# Patient Record
Sex: FEMALE | Race: WHITE | NOT HISPANIC OR LATINO | Employment: FULL TIME | URBAN - METROPOLITAN AREA
[De-identification: names, ages, dates, MRNs, and addresses within clinical notes are randomized per-mention and may not be internally consistent; named-entity substitution may affect disease eponyms.]

---

## 2017-06-07 ENCOUNTER — HISTORICAL (OUTPATIENT)
Dept: LAB | Facility: HOSPITAL | Age: 47
End: 2017-06-07

## 2017-06-07 LAB
APPEARANCE, UA: CLEAR
BACTERIA SPEC CULT: ABNORMAL /HPF
BILIRUB UR QL STRIP: NEGATIVE
COLOR UR: ABNORMAL
GLUCOSE (UA): NEGATIVE
HGB UR QL STRIP: ABNORMAL
KETONES UR QL STRIP: NEGATIVE
LEUKOCYTE ESTERASE UR QL STRIP: ABNORMAL
NITRITE UR QL STRIP: NEGATIVE
PH UR STRIP: 6 [PH]
PROT UR QL STRIP: 100 MG/DL
RBC #/AREA URNS HPF: ABNORMAL /HPF
SP GR UR STRIP: 1.02
SQUAMOUS EPITHELIAL, UA: ABNORMAL /LPF
UROBILINOGEN UR STRIP-ACNC: 0.2 EU/DL
WBC #/AREA URNS HPF: ABNORMAL /HPF

## 2017-06-09 LAB — FINAL CULTURE: NO GROWTH

## 2017-06-16 ENCOUNTER — HISTORICAL (OUTPATIENT)
Dept: ADMINISTRATIVE | Facility: HOSPITAL | Age: 47
End: 2017-06-16

## 2017-07-14 ENCOUNTER — HISTORICAL (OUTPATIENT)
Dept: LAB | Facility: HOSPITAL | Age: 47
End: 2017-07-14

## 2017-07-14 LAB
APPEARANCE, UA: CLEAR
BACTERIA SPEC CULT: ABNORMAL /HPF
BILIRUB UR QL STRIP: NEGATIVE
COLOR UR: YELLOW
GLUCOSE (UA): NEGATIVE
HGB UR QL STRIP: ABNORMAL
KETONES UR QL STRIP: NEGATIVE
LEUKOCYTE ESTERASE UR QL STRIP: ABNORMAL
NITRITE UR QL STRIP: NEGATIVE
PH UR STRIP: 7 [PH]
PROT UR QL STRIP: ABNORMAL
RBC #/AREA URNS HPF: ABNORMAL /HPF
SP GR UR STRIP: 1.01
SQUAMOUS EPITHELIAL, UA: ABNORMAL /LPF
UROBILINOGEN UR STRIP-ACNC: 0.2 EU/DL
WBC #/AREA URNS HPF: ABNORMAL /HPF

## 2017-07-17 LAB — FINAL CULTURE: NO GROWTH

## 2018-03-14 ENCOUNTER — HISTORICAL (OUTPATIENT)
Dept: RADIOLOGY | Facility: HOSPITAL | Age: 48
End: 2018-03-14

## 2018-05-24 ENCOUNTER — HISTORICAL (OUTPATIENT)
Dept: RADIOLOGY | Facility: HOSPITAL | Age: 48
End: 2018-05-24

## 2018-06-01 ENCOUNTER — HISTORICAL (OUTPATIENT)
Dept: RADIOLOGY | Facility: HOSPITAL | Age: 48
End: 2018-06-01

## 2018-06-29 ENCOUNTER — HISTORICAL (OUTPATIENT)
Dept: RADIOLOGY | Facility: HOSPITAL | Age: 48
End: 2018-06-29

## 2019-11-20 ENCOUNTER — HISTORICAL (OUTPATIENT)
Dept: LAB | Facility: HOSPITAL | Age: 49
End: 2019-11-20

## 2019-11-20 LAB
ABS NEUT (OLG): 2.5 X10(3)/MCL (ref 1.5–6.9)
ALBUMIN SERPL-MCNC: 3.7 GM/DL (ref 3.4–5)
ALBUMIN/GLOB SERPL: 1.1 RATIO
ALP SERPL-CCNC: 107 UNIT/L (ref 30–113)
ALT SERPL-CCNC: 109 UNIT/L (ref 10–45)
AST SERPL-CCNC: 53 UNIT/L (ref 15–37)
BASOPHILS # BLD AUTO: 0.1 X10(3)/MCL (ref 0–0.1)
BASOPHILS NFR BLD AUTO: 1 % (ref 0–1)
BILIRUB SERPL-MCNC: 0.4 MG/DL (ref 0.1–0.9)
BILIRUBIN DIRECT+TOT PNL SERPL-MCNC: 0.1 MG/DL (ref 0–0.3)
BILIRUBIN DIRECT+TOT PNL SERPL-MCNC: 0.3 MG/DL
BUN SERPL-MCNC: 18 MG/DL (ref 10–20)
CALCIUM SERPL-MCNC: 8.8 MG/DL (ref 8–10.5)
CHLORIDE SERPL-SCNC: 108 MMOL/L (ref 100–108)
CO2 SERPL-SCNC: 29 MMOL/L (ref 21–35)
CREAT SERPL-MCNC: 0.97 MG/DL (ref 0.7–1.3)
CRP SERPL-MCNC: <0.2 MG/DL (ref 0–0.9)
DEPRECATED CALCIDIOL+CALCIFEROL SERPL-MC: 22.71 NG/ML (ref 30–80)
EOSINOPHIL # BLD AUTO: 0.2 X10(3)/MCL (ref 0–0.6)
EOSINOPHIL NFR BLD AUTO: 4 % (ref 0–5)
ERYTHROCYTE [DISTWIDTH] IN BLOOD BY AUTOMATED COUNT: 12.9 % (ref 11.5–17)
ERYTHROCYTE [SEDIMENTATION RATE] IN BLOOD: 15 MM/HR (ref 0–20)
FERRITIN SERPL-MCNC: 42 NG/ML (ref 3–252)
FOLATE SERPL-MCNC: 4 NG/ML (ref 8.6–58.9)
GLOBULIN SER-MCNC: 3.4 GM/DL
GLUCOSE SERPL-MCNC: 99 MG/DL (ref 75–116)
HCT VFR BLD AUTO: 41.9 % (ref 36–48)
HGB BLD-MCNC: 13.5 GM/DL (ref 12–16)
IMM GRANULOCYTES # BLD AUTO: 0.03 10*3/UL (ref 0–0.02)
IMM GRANULOCYTES NFR BLD AUTO: 0.5 % (ref 0–0.43)
IRON SATN MFR SERPL: 27 % (ref 15–55)
IRON SERPL-MCNC: 74 MCG/DL (ref 50–170)
LYMPHOCYTES # BLD AUTO: 2.4 X10(3)/MCL (ref 0.5–4.1)
LYMPHOCYTES NFR BLD AUTO: 43 % (ref 15–40)
MCH RBC QN AUTO: 33 PG (ref 27–34)
MCHC RBC AUTO-ENTMCNC: 32 GM/DL (ref 31–36)
MCV RBC AUTO: 103 FL (ref 80–99)
MONOCYTES # BLD AUTO: 0.4 X10(3)/MCL (ref 0–1.1)
MONOCYTES NFR BLD AUTO: 8 % (ref 4–12)
NEUTROPHILS # BLD AUTO: 2.5 X10(3)/MCL (ref 1.5–6.9)
NEUTROPHILS NFR BLD AUTO: 44 % (ref 43–75)
PLATELET # BLD AUTO: 229 X10(3)/MCL (ref 140–400)
PMV BLD AUTO: 9.6 FL (ref 6.8–10)
POTASSIUM SERPL-SCNC: 4.3 MMOL/L (ref 3.6–5.2)
PROT SERPL-MCNC: 7.1 GM/DL (ref 6.4–8.2)
RBC # BLD AUTO: 4.06 X10(6)/MCL (ref 4.2–5.4)
SODIUM SERPL-SCNC: 142 MMOL/L (ref 135–145)
TIBC SERPL-MCNC: 199 MCG/DL (ref 150–375)
TIBC SERPL-MCNC: 273 MCG/DL (ref 250–450)
TSH SERPL-ACNC: 2.85 MIU/ML (ref 0.36–3.74)
VIT B12 SERPL-MCNC: 318 PG/ML (ref 193–986)
WBC # SPEC AUTO: 5.6 X10(3)/MCL (ref 4.5–11.5)

## 2019-11-29 ENCOUNTER — HISTORICAL (OUTPATIENT)
Dept: RADIOLOGY | Facility: HOSPITAL | Age: 49
End: 2019-11-29

## 2019-12-06 ENCOUNTER — HISTORICAL (OUTPATIENT)
Dept: RADIOLOGY | Facility: HOSPITAL | Age: 49
End: 2019-12-06

## 2019-12-10 ENCOUNTER — HISTORICAL (OUTPATIENT)
Dept: LAB | Facility: HOSPITAL | Age: 49
End: 2019-12-10

## 2019-12-10 LAB
FERRITIN SERPL-MCNC: 47.7 NG/ML (ref 8–388)
HAV IGM SERPL QL IA: NEGATIVE
HBV SURFACE AG SERPL QL IA: NEGATIVE
HCV AB SERPL QL IA: NEGATIVE
IRON SATN MFR SERPL: 25.4 % (ref 20–50)
IRON SERPL-MCNC: 77 MCG/DL (ref 50–175)
TIBC SERPL-MCNC: 303 MCG/DL (ref 250–450)
TRANSFERRIN SERPL-MCNC: 243 MG/DL (ref 200–360)

## 2019-12-13 LAB — CRC RECOMMENDATION EXT: NORMAL

## 2020-01-14 ENCOUNTER — HISTORICAL (OUTPATIENT)
Dept: RADIOLOGY | Facility: HOSPITAL | Age: 50
End: 2020-01-14

## 2020-01-20 ENCOUNTER — HISTORICAL (OUTPATIENT)
Dept: RADIOLOGY | Facility: HOSPITAL | Age: 50
End: 2020-01-20

## 2020-10-02 ENCOUNTER — HISTORICAL (OUTPATIENT)
Dept: LAB | Facility: HOSPITAL | Age: 50
End: 2020-10-02

## 2021-01-20 ENCOUNTER — HISTORICAL (OUTPATIENT)
Dept: RADIOLOGY | Facility: HOSPITAL | Age: 51
End: 2021-01-20

## 2021-09-01 ENCOUNTER — HISTORICAL (OUTPATIENT)
Dept: LAB | Facility: HOSPITAL | Age: 51
End: 2021-09-01

## 2021-09-01 LAB
ABS NEUT (OLG): 3.1 X10(3)/MCL (ref 1.5–6.9)
ALBUMIN SERPL-MCNC: 2.8 GM/DL (ref 3.5–5)
ALBUMIN/GLOB SERPL: 0.8 RATIO (ref 1.1–2)
ALP SERPL-CCNC: 68 UNIT/L (ref 40–150)
ALT SERPL-CCNC: 14 UNIT/L (ref 0–55)
AST SERPL-CCNC: 18 UNIT/L (ref 5–34)
BASOPHILS # BLD AUTO: 0 X10(3)/MCL (ref 0–0.1)
BASOPHILS NFR BLD AUTO: 1 % (ref 0–1)
BILIRUB SERPL-MCNC: 0.3 MG/DL
BILIRUBIN DIRECT+TOT PNL SERPL-MCNC: 0.1 MG/DL (ref 0–0.8)
BILIRUBIN DIRECT+TOT PNL SERPL-MCNC: 0.2 MG/DL (ref 0–0.5)
BUN SERPL-MCNC: 10 MG/DL (ref 9.8–20.1)
CALCIUM SERPL-MCNC: 8.8 MG/DL (ref 8.4–10.2)
CHLORIDE SERPL-SCNC: 109 MMOL/L (ref 98–107)
CHOLEST SERPL-MCNC: 178 MG/DL
CHOLEST/HDLC SERPL: 5 {RATIO} (ref 0–5)
CO2 SERPL-SCNC: 25 MMOL/L (ref 22–29)
CREAT SERPL-MCNC: 0.91 MG/DL (ref 0.55–1.02)
EOSINOPHIL # BLD AUTO: 0.1 X10(3)/MCL (ref 0–0.6)
EOSINOPHIL NFR BLD AUTO: 2 % (ref 0–5)
ERYTHROCYTE [DISTWIDTH] IN BLOOD BY AUTOMATED COUNT: 13.9 % (ref 11.5–17)
GLOBULIN SER-MCNC: 3.7 GM/DL (ref 2.4–3.5)
GLUCOSE SERPL-MCNC: 128 MG/DL (ref 74–100)
HCT VFR BLD AUTO: 33.9 % (ref 36–48)
HDLC SERPL-MCNC: 34 MG/DL (ref 35–60)
HGB BLD-MCNC: 11.2 GM/DL (ref 12–16)
IMM GRANULOCYTES # BLD AUTO: 0.06 10*3/UL (ref 0–0.02)
IMM GRANULOCYTES NFR BLD AUTO: 1 % (ref 0–0.43)
LDLC SERPL CALC-MCNC: 105 MG/DL (ref 50–140)
LYMPHOCYTES # BLD AUTO: 2 X10(3)/MCL (ref 0.5–4.1)
LYMPHOCYTES NFR BLD AUTO: 33 % (ref 15–40)
MCH RBC QN AUTO: 32 PG (ref 27–34)
MCHC RBC AUTO-ENTMCNC: 33 GM/DL (ref 31–36)
MCV RBC AUTO: 96 FL (ref 80–99)
MONOCYTES # BLD AUTO: 0.6 X10(3)/MCL (ref 0–1.1)
MONOCYTES NFR BLD AUTO: 10 % (ref 4–12)
NEUTROPHILS # BLD AUTO: 3.1 X10(3)/MCL (ref 1.5–6.9)
NEUTROPHILS NFR BLD AUTO: 53 % (ref 43–75)
PLATELET # BLD AUTO: 466 X10(3)/MCL (ref 140–400)
PMV BLD AUTO: 9.1 FL (ref 6.8–10)
POTASSIUM SERPL-SCNC: 2.4 MMOL/L (ref 3.5–5.1)
PROT SERPL-MCNC: 6.5 GM/DL (ref 6.4–8.3)
RBC # BLD AUTO: 3.52 X10(6)/MCL (ref 4.2–5.4)
SODIUM SERPL-SCNC: 140 MMOL/L (ref 136–145)
TRIGL SERPL-MCNC: 193 MG/DL (ref 37–140)
TSH SERPL-ACNC: 0.85 UIU/ML (ref 0.35–4.94)
VLDLC SERPL CALC-MCNC: 39 MG/DL
WBC # SPEC AUTO: 5.9 X10(3)/MCL (ref 4.5–11.5)

## 2021-09-02 ENCOUNTER — HISTORICAL (OUTPATIENT)
Dept: LAB | Facility: HOSPITAL | Age: 51
End: 2021-09-02

## 2021-09-02 LAB
BUN SERPL-MCNC: 8 MG/DL (ref 9.8–20.1)
CALCIUM SERPL-MCNC: 8.6 MG/DL (ref 8.4–10.2)
CHLORIDE SERPL-SCNC: 110 MMOL/L (ref 98–107)
CO2 SERPL-SCNC: 27 MMOL/L (ref 22–29)
CREAT SERPL-MCNC: 0.83 MG/DL (ref 0.55–1.02)
CREAT/UREA NIT SERPL: 10
GLUCOSE SERPL-MCNC: 97 MG/DL (ref 74–100)
POTASSIUM SERPL-SCNC: 3.7 MMOL/L (ref 3.5–5.1)
SODIUM SERPL-SCNC: 141 MMOL/L (ref 136–145)

## 2021-09-07 ENCOUNTER — HISTORICAL (OUTPATIENT)
Dept: RADIOLOGY | Facility: HOSPITAL | Age: 51
End: 2021-09-07

## 2021-11-02 ENCOUNTER — HISTORICAL (OUTPATIENT)
Dept: RADIOLOGY | Facility: HOSPITAL | Age: 51
End: 2021-11-02

## 2021-12-06 ENCOUNTER — HISTORICAL (OUTPATIENT)
Dept: RADIOLOGY | Facility: HOSPITAL | Age: 51
End: 2021-12-06

## 2022-01-18 ENCOUNTER — HISTORICAL (OUTPATIENT)
Dept: LAB | Facility: HOSPITAL | Age: 52
End: 2022-01-18

## 2022-01-25 ENCOUNTER — HISTORICAL (OUTPATIENT)
Dept: RADIOLOGY | Facility: HOSPITAL | Age: 52
End: 2022-01-25

## 2022-03-02 ENCOUNTER — HISTORICAL (OUTPATIENT)
Dept: ADMINISTRATIVE | Facility: HOSPITAL | Age: 52
End: 2022-03-02

## 2022-03-14 ENCOUNTER — HISTORICAL (OUTPATIENT)
Dept: ADMINISTRATIVE | Facility: HOSPITAL | Age: 52
End: 2022-03-14

## 2022-03-17 ENCOUNTER — PATIENT OUTREACH (OUTPATIENT)
Dept: EMERGENCY MEDICINE | Facility: HOSPITAL | Age: 52
End: 2022-03-17
Payer: MEDICAID

## 2022-03-29 ENCOUNTER — HISTORICAL (OUTPATIENT)
Dept: ADMINISTRATIVE | Facility: HOSPITAL | Age: 52
End: 2022-03-29

## 2022-03-29 ENCOUNTER — HISTORICAL (OUTPATIENT)
Dept: RADIOLOGY | Facility: HOSPITAL | Age: 52
End: 2022-03-29

## 2022-04-09 ENCOUNTER — HISTORICAL (OUTPATIENT)
Dept: ADMINISTRATIVE | Facility: HOSPITAL | Age: 52
End: 2022-04-09
Payer: MEDICAID

## 2022-04-26 VITALS
HEIGHT: 65 IN | SYSTOLIC BLOOD PRESSURE: 121 MMHG | BODY MASS INDEX: 23.88 KG/M2 | OXYGEN SATURATION: 98 % | DIASTOLIC BLOOD PRESSURE: 80 MMHG | WEIGHT: 143.31 LBS

## 2022-04-30 NOTE — OP NOTE
DATE OF SURGERY:    06/16/2017    SURGEON:  Arpit Duarte MD    PREOPERATIVE DIAGNOSES:  Right ureteropelvic junction obstruction, hydronephrosis.    POSTOPERATIVE DIAGNOSES:  Right ureteropelvic junction obstruction, hydronephrosis.    PROCEDURES:  Cystoscopy, right retrograde right diagnostic ureteroscopy.    ANESTHESIA:  General.    COMPLICATIONS:  None.    IV FLUIDS:  1,000 cc crystalloid.    ESTIMATED BLOOD LOSS:  0 cc.    FINDINGS:  Removal of right indwelling ureteral stent, right retrograde pyelogram showing obstructed UPJ as previous, diagnostic ureteroscopy with no signs of tumor, inability of bypass obstruction with scope.    DISPOSITION:  Taken to PACU in stable condition.    CONDITION:  Without problems.    BRIEF CLINICAL HISTORY:  This patient is a 46-year-old  female who has had recurrent pyelonephritis and flank pain with multiple admissions.  She underwent workup by me on previous admission, showing suspected right UPJ obstruction.  Stent was placed.  She was kept on treatment for a UTI and followed up.  Plans today were for diagnostic ureteroscopy to ensure no malignancy as cause of obstruction.  All risks, benefits and treatment alternatives were discussed.  Well informed consent was obtained.    PROCEDURE IN DETAIL:  After informed consent, she was taken to the operating room and placed in supine position.  General endotracheal anesthesia was induced.  She was repositioned in dorsal lithotomy.  She was prepped and draped in usual sterile fashion.  She received appropriate preoperative antibiotics.  Timeout was performed.  We entered the bladder with 21-Korean rigid cystoscope, visualized right indwelling ureteral stent.  Glidewire was placed alongside the stent under fluoro guidance.  Stent was then removed.  Under direct vision, a 2nd wire had been cannulated through the stent.  We back bled the stent.  5-Korean was placed over the wire.  Gentle retrograde pyelogram  showing a tight area near UPJ with minimal contrast passing.  We then used the indwelling wire and advanced the scope over the wire to the level of the UPJ.  Removed the wire.  We performed diagnostic ureteroscopy.  We were unable to bypass the obstruction.  There were no signs of ureteral malignancy in this area.  At this point, we left her without a stent and emptied the bladder, terminated the procedure.    PLAN:  She will be scheduled for robotic right pyeloplasty.  We will keep her on prophylactic antibiotics and pain control for the time being.  She is to call clinic or return to the ER with any fevers greater than 101.4, intractable pain, intractable nausea, intractable vomiting.        ______________________________  MD OCTAVIA Mcgee/OLIVER  DD:  06/16/2017  Time:  01:25PM  DT:  06/17/2017  Time:  12:46AM  Job #:  [id number]

## 2022-05-16 DIAGNOSIS — M54.16 RADICULOPATHY, LUMBAR REGION: ICD-10-CM

## 2022-05-16 DIAGNOSIS — S32.018A OTHER FRACTURE OF FIRST LUMBAR VERTEBRA, INITIAL ENCOUNTER FOR CLOSED FRACTURE: Primary | ICD-10-CM

## 2022-05-16 DIAGNOSIS — M62.830 MUSCLE SPASM OF BACK: ICD-10-CM

## 2022-05-25 ENCOUNTER — PATIENT OUTREACH (OUTPATIENT)
Dept: EMERGENCY MEDICINE | Facility: HOSPITAL | Age: 52
End: 2022-05-25
Payer: MEDICAID

## 2022-06-02 ENCOUNTER — HOSPITAL ENCOUNTER (OUTPATIENT)
Dept: RADIOLOGY | Facility: HOSPITAL | Age: 52
Discharge: HOME | End: 2022-06-02
Attending: NURSE PRACTITIONER
Payer: MEDICAID

## 2022-06-02 DIAGNOSIS — M54.16 RADICULOPATHY, LUMBAR REGION: ICD-10-CM

## 2022-06-02 DIAGNOSIS — S32.018A OTHER FRACTURE OF FIRST LUMBAR VERTEBRA, INITIAL ENCOUNTER FOR CLOSED FRACTURE: ICD-10-CM

## 2022-06-02 DIAGNOSIS — M62.830 MUSCLE SPASM OF BACK: ICD-10-CM

## 2022-06-02 PROCEDURE — 72148 MRI LUMBAR SPINE W/O DYE: CPT | Mod: TC
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
## 2022-07-27 ENCOUNTER — PATIENT OUTREACH (OUTPATIENT)
Dept: EMERGENCY MEDICINE | Facility: HOSPITAL | Age: 52
End: 2022-07-27
Payer: MEDICAID

## 2022-08-08 DIAGNOSIS — M54.9 BACK PAIN, UNSPECIFIED BACK LOCATION, UNSPECIFIED BACK PAIN LATERALITY, UNSPECIFIED CHRONICITY: Primary | ICD-10-CM

## 2022-09-26 ENCOUNTER — HOSPITAL ENCOUNTER (EMERGENCY)
Facility: HOSPITAL | Age: 52
Discharge: HOME | End: 2022-09-26
Attending: INTERNAL MEDICINE
Payer: MEDICAID

## 2022-09-26 VITALS
SYSTOLIC BLOOD PRESSURE: 119 MMHG | WEIGHT: 134.19 LBS | RESPIRATION RATE: 16 BRPM | HEIGHT: 65 IN | TEMPERATURE: 99 F | HEART RATE: 84 BPM | DIASTOLIC BLOOD PRESSURE: 73 MMHG | OXYGEN SATURATION: 98 % | BODY MASS INDEX: 22.36 KG/M2

## 2022-09-26 DIAGNOSIS — T78.40XA ALLERGIC REACTION, INITIAL ENCOUNTER: ICD-10-CM

## 2022-09-26 DIAGNOSIS — L29.9 PRURITUS: ICD-10-CM

## 2022-09-26 DIAGNOSIS — L50.9 URTICARIA: Primary | ICD-10-CM

## 2022-09-26 PROCEDURE — 25000003 PHARM REV CODE 250: Performed by: INTERNAL MEDICINE

## 2022-09-26 PROCEDURE — 63600175 PHARM REV CODE 636 W HCPCS: Performed by: INTERNAL MEDICINE

## 2022-09-26 PROCEDURE — 96372 THER/PROPH/DIAG INJ SC/IM: CPT | Performed by: INTERNAL MEDICINE

## 2022-09-26 PROCEDURE — 99284 EMERGENCY DEPT VISIT MOD MDM: CPT | Mod: 25

## 2022-09-26 RX ORDER — DIPHENHYDRAMINE HCL 25 MG
25 CAPSULE ORAL
Status: COMPLETED | OUTPATIENT
Start: 2022-09-26 | End: 2022-09-26

## 2022-09-26 RX ORDER — HYDROXYZINE 50 MG/ML
25 INJECTION, SOLUTION INTRAMUSCULAR ONCE
Status: COMPLETED | OUTPATIENT
Start: 2022-09-26 | End: 2022-09-26

## 2022-09-26 RX ORDER — HYDROXYZINE HYDROCHLORIDE 25 MG/1
25 TABLET, FILM COATED ORAL 3 TIMES DAILY PRN
Qty: 30 TABLET | Refills: 0 | Status: SHIPPED | OUTPATIENT
Start: 2022-09-26 | End: 2022-10-06

## 2022-09-26 RX ADMIN — DIPHENHYDRAMINE HYDROCHLORIDE 25 MG: 25 CAPSULE ORAL at 05:09

## 2022-09-26 RX ADMIN — HYDROXYZINE HYDROCHLORIDE 25 MG: 50 INJECTION, SOLUTION INTRAMUSCULAR at 05:09

## 2022-09-26 NOTE — ED PROVIDER NOTES
Encounter Date: 2022       History     Chief Complaint   Patient presents with    Rash     Pt to ED c/o itchy rash on bilateral arms, trunk, and neck     52-year-old white female presents to the emergency department with urticaria on arms neck and scalp.  She states that Friday she began having some itchy spots on her arms, she admits that she was doing some yd work and does.  It was something that she got her hands into causing a local reaction and through the weekend it became of extremely pruritic but it was still limited to arms.  Over the night last night it beginning on upper arms and spread to her neck and chest and head so she came to the ER this morning to get evaluated.    Review of patient's allergies indicates:  No Known Allergies  Past Medical History:   Diagnosis Date    Anxiety disorder, unspecified     Depression      Past Surgical History:   Procedure Laterality Date    APPENDECTOMY      BACK SURGERY      x2     SECTION, CLASSIC      x4    CHOLECYSTECTOMY      COSMETIC SURGERY      reconstruction    TONSILLECTOMY       History reviewed. No pertinent family history.  Social History     Tobacco Use    Smoking status: Some Days     Types: Cigarettes    Smokeless tobacco: Never   Substance Use Topics    Alcohol use: Yes     Comment: occassional     Review of Systems   Constitutional: Negative.  Negative for activity change, appetite change, chills, diaphoresis, fatigue, fever and unexpected weight change.   HENT: Negative.  Negative for congestion, dental problem, drooling, ear discharge, ear pain, facial swelling, hearing loss, mouth sores, nosebleeds, postnasal drip, rhinorrhea, sinus pressure, sinus pain, sneezing, sore throat, tinnitus, trouble swallowing and voice change.    Eyes: Negative.  Negative for photophobia, pain, discharge, redness, itching and visual disturbance.   Respiratory: Negative.  Negative for apnea, cough, choking, chest tightness, shortness of breath, wheezing and  stridor.    Cardiovascular: Negative.  Negative for chest pain, palpitations and leg swelling.   Gastrointestinal: Negative.  Negative for abdominal distention, abdominal pain, anal bleeding, blood in stool, constipation, diarrhea, nausea, rectal pain and vomiting.   Endocrine: Negative.  Negative for cold intolerance, heat intolerance, polydipsia, polyphagia and polyuria.   Genitourinary: Negative.  Negative for decreased urine volume, difficulty urinating, dyspareunia, dysuria, enuresis, flank pain, frequency, genital sores, hematuria, menstrual problem, pelvic pain, urgency, vaginal bleeding, vaginal discharge and vaginal pain.   Musculoskeletal: Negative.  Negative for arthralgias, back pain, gait problem, joint swelling, myalgias, neck pain and neck stiffness.   Skin:  Positive for rash. Negative for color change, pallor and wound.   Allergic/Immunologic: Negative.  Negative for environmental allergies, food allergies and immunocompromised state.   Neurological: Negative.  Negative for dizziness, tremors, seizures, syncope, facial asymmetry, speech difficulty, weakness, light-headedness, numbness and headaches.   Hematological: Negative.  Negative for adenopathy. Does not bruise/bleed easily.   Psychiatric/Behavioral: Negative.  Negative for agitation, behavioral problems, confusion, decreased concentration, dysphoric mood, hallucinations, self-injury, sleep disturbance and suicidal ideas. The patient is not nervous/anxious and is not hyperactive.    All other systems reviewed and are negative.    Physical Exam     Initial Vitals [09/26/22 0452]   BP Pulse Resp Temp SpO2   119/73 84 16 98.6 °F (37 °C) 98 %      MAP       --         Physical Exam    Nursing note and vitals reviewed.  Constitutional: She appears well-developed and well-nourished. She is not diaphoretic. No distress.   HENT:   Head: Normocephalic and atraumatic.   Mouth/Throat: Oropharynx is clear and moist. No oropharyngeal exudate.   Eyes:  Conjunctivae and EOM are normal. Pupils are equal, round, and reactive to light. No scleral icterus.   Neck: Neck supple. No JVD present.   Normal range of motion.  Cardiovascular:  Normal rate, regular rhythm, normal heart sounds and intact distal pulses.     Exam reveals no gallop and no friction rub.       No murmur heard.  Pulmonary/Chest: Breath sounds normal. No respiratory distress. She has no wheezes. She exhibits no tenderness.   Abdominal: Abdomen is soft. Bowel sounds are normal. She exhibits no distension. There is no abdominal tenderness. There is no rebound.   Musculoskeletal:         General: Normal range of motion.      Cervical back: Normal range of motion and neck supple.     Neurological: She is alert and oriented to person, place, and time. She has normal strength and normal reflexes.   Skin: Skin is warm and dry. Capillary refill takes less than 2 seconds. Rash noted. Rash is urticarial.   Urticaria noted bilateral arms with excoriations and minimal erythema, more areas of urticaria on the neck bilaterally with some erythema in on the anterior chest wall   Psychiatric: She has a normal mood and affect. Her behavior is normal. Judgment and thought content normal.       ED Course   Procedures  Labs Reviewed - No data to display       Imaging Results    None          Medications   hydrOXYzine injection 25 mg (25 mg Intramuscular Given 9/26/22 0509)   diphenhydrAMINE capsule 25 mg (25 mg Oral Given 9/26/22 0509)                 ED Course as of 09/26/22 0549   Mon Sep 26, 2022   0547 She feels much better after the Vistaril injection and oral Benadryl.  She is no longer scratching.  The hives or beginning to reduce in the erythema is completely gone currently she states she is ready to go home and I will write her a script for hydroxyzine to use as needed for itching [PL]      ED Course User Index  [PL] Matthew Nolasco MD                 Clinical Impression:   Final diagnoses:  [L50.9] Urticaria  (Primary)  [T78.40XA] Allergic reaction, initial encounter  [L29.9] Pruritus      ED Disposition Condition    Discharge Stable          ED Prescriptions       Medication Sig Dispense Start Date End Date Auth. Provider    hydrOXYzine HCL (ATARAX) 25 MG tablet Take 1 tablet (25 mg total) by mouth 3 (three) times daily as needed for Itching. 30 tablet 9/26/2022 10/6/2022 Matthew Nolasco MD          Follow-up Information       Follow up With Specialties Details Why Contact Info    Primary care physician  In 3 days            Gina Fang is a certified MA and was present during the entire interaction with this patient      Matthew Nolasco MD  09/26/22 0549

## 2023-04-19 ENCOUNTER — HOSPITAL ENCOUNTER (EMERGENCY)
Facility: HOSPITAL | Age: 53
Discharge: HOME | End: 2023-04-19
Attending: EMERGENCY MEDICINE
Payer: MEDICAID

## 2023-04-19 VITALS
DIASTOLIC BLOOD PRESSURE: 84 MMHG | BODY MASS INDEX: 23.16 KG/M2 | OXYGEN SATURATION: 99 % | RESPIRATION RATE: 18 BRPM | TEMPERATURE: 99 F | HEART RATE: 78 BPM | WEIGHT: 139.19 LBS | SYSTOLIC BLOOD PRESSURE: 138 MMHG

## 2023-04-19 DIAGNOSIS — S09.90XA CLOSED HEAD INJURY, INITIAL ENCOUNTER: ICD-10-CM

## 2023-04-19 DIAGNOSIS — T14.8XXA ABRASION: ICD-10-CM

## 2023-04-19 DIAGNOSIS — Z12.31 ENCOUNTER FOR SCREENING MAMMOGRAM FOR MALIGNANT NEOPLASM OF BREAST: Primary | ICD-10-CM

## 2023-04-19 DIAGNOSIS — S20.212A CONTUSION OF LEFT FRONT WALL OF THORAX, INITIAL ENCOUNTER: Primary | ICD-10-CM

## 2023-04-19 PROCEDURE — 25000003 PHARM REV CODE 250: Performed by: EMERGENCY MEDICINE

## 2023-04-19 PROCEDURE — 96372 THER/PROPH/DIAG INJ SC/IM: CPT | Performed by: EMERGENCY MEDICINE

## 2023-04-19 PROCEDURE — 63600175 PHARM REV CODE 636 W HCPCS: Performed by: EMERGENCY MEDICINE

## 2023-04-19 PROCEDURE — 99285 EMERGENCY DEPT VISIT HI MDM: CPT | Mod: 25

## 2023-04-19 RX ORDER — CYCLOBENZAPRINE HCL 10 MG
10 TABLET ORAL 3 TIMES DAILY PRN
Qty: 15 TABLET | Refills: 0 | Status: SHIPPED | OUTPATIENT
Start: 2023-04-19 | End: 2023-04-24

## 2023-04-19 RX ORDER — KETOROLAC TROMETHAMINE 30 MG/ML
30 INJECTION, SOLUTION INTRAMUSCULAR; INTRAVENOUS
Status: COMPLETED | OUTPATIENT
Start: 2023-04-19 | End: 2023-04-19

## 2023-04-19 RX ORDER — HYDROCODONE BITARTRATE AND ACETAMINOPHEN 5; 325 MG/1; MG/1
1 TABLET ORAL
Status: COMPLETED | OUTPATIENT
Start: 2023-04-19 | End: 2023-04-19

## 2023-04-19 RX ORDER — IBUPROFEN 800 MG/1
800 TABLET ORAL EVERY 8 HOURS PRN
Qty: 20 TABLET | Refills: 0 | Status: SHIPPED | OUTPATIENT
Start: 2023-04-19

## 2023-04-19 RX ADMIN — KETOROLAC TROMETHAMINE 30 MG: 30 INJECTION, SOLUTION INTRAMUSCULAR; INTRAVENOUS at 06:04

## 2023-04-19 RX ADMIN — HYDROCODONE BITARTRATE AND ACETAMINOPHEN 1 TABLET: 5; 325 TABLET ORAL at 05:04
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
## 2023-04-26 ENCOUNTER — HOSPITAL ENCOUNTER (OUTPATIENT)
Dept: RADIOLOGY | Facility: HOSPITAL | Age: 53
Discharge: HOME | End: 2023-04-26
Attending: NURSE PRACTITIONER
Payer: MEDICAID

## 2023-04-26 DIAGNOSIS — Z12.31 ENCOUNTER FOR SCREENING MAMMOGRAM FOR MALIGNANT NEOPLASM OF BREAST: ICD-10-CM

## 2023-04-26 PROCEDURE — 77067 MAMMO DIGITAL SCREENING BILAT WITH TOMO: ICD-10-PCS | Mod: 26,,, | Performed by: STUDENT IN AN ORGANIZED HEALTH CARE EDUCATION/TRAINING PROGRAM

## 2023-04-26 PROCEDURE — 77067 SCR MAMMO BI INCL CAD: CPT | Mod: 26,,, | Performed by: STUDENT IN AN ORGANIZED HEALTH CARE EDUCATION/TRAINING PROGRAM

## 2023-04-26 PROCEDURE — 77063 MAMMO DIGITAL SCREENING BILAT WITH TOMO: ICD-10-PCS | Mod: 26,,, | Performed by: STUDENT IN AN ORGANIZED HEALTH CARE EDUCATION/TRAINING PROGRAM

## 2023-04-26 PROCEDURE — 77067 SCR MAMMO BI INCL CAD: CPT | Mod: TC

## 2023-04-26 PROCEDURE — 77063 BREAST TOMOSYNTHESIS BI: CPT | Mod: 26,,, | Performed by: STUDENT IN AN ORGANIZED HEALTH CARE EDUCATION/TRAINING PROGRAM

## 2023-05-22 LAB
CHOLEST SERPL-MSCNC: 225 MG/DL (ref 0–200)
CO2 SERPL-SCNC: 19 MMOL/L (ref 20–29)
CREAT SERPL-MCNC: 1.1 MG/DL (ref 0.6–1)
HBA1C MFR BLD: 5.1 % (ref 4–6)
HDLC SERPL-MCNC: 62 MG/DL (ref 35–70)
LDLC SERPL CALC-MCNC: 135 MG/DL (ref 0–160)
TRIGL SERPL-MCNC: 159 MG/DL (ref 40–160)
TSH: 1.88

## 2023-05-23 DIAGNOSIS — K58.0 IRRITABLE BOWEL SYNDROME WITH DIARRHEA: Primary | ICD-10-CM

## 2023-07-12 ENCOUNTER — HOSPITAL ENCOUNTER (EMERGENCY)
Facility: HOSPITAL | Age: 53
Discharge: HOME | End: 2023-07-12
Attending: EMERGENCY MEDICINE
Payer: MEDICAID

## 2023-07-12 VITALS
HEART RATE: 82 BPM | DIASTOLIC BLOOD PRESSURE: 72 MMHG | BODY MASS INDEX: 23.16 KG/M2 | OXYGEN SATURATION: 98 % | WEIGHT: 139 LBS | SYSTOLIC BLOOD PRESSURE: 110 MMHG | RESPIRATION RATE: 18 BRPM | HEIGHT: 65 IN | TEMPERATURE: 99 F

## 2023-07-12 DIAGNOSIS — N39.0 URINARY TRACT INFECTION WITHOUT HEMATURIA, SITE UNSPECIFIED: ICD-10-CM

## 2023-07-12 DIAGNOSIS — G43.809 OTHER MIGRAINE WITHOUT STATUS MIGRAINOSUS, NOT INTRACTABLE: Primary | ICD-10-CM

## 2023-07-12 LAB
ALBUMIN SERPL-MCNC: 4 G/DL (ref 3.5–5)
ALBUMIN/GLOB SERPL: 1.3 RATIO (ref 1.1–2)
ALP SERPL-CCNC: 74 UNIT/L (ref 40–150)
ALT SERPL-CCNC: 27 UNIT/L (ref 0–55)
APPEARANCE UR: CLEAR
AST SERPL-CCNC: 23 UNIT/L (ref 5–34)
BACTERIA #/AREA URNS AUTO: ABNORMAL /HPF
BASOPHILS # BLD AUTO: 0.09 X10(3)/MCL
BASOPHILS NFR BLD AUTO: 1.4 %
BILIRUB UR QL STRIP.AUTO: NEGATIVE
BILIRUBIN DIRECT+TOT PNL SERPL-MCNC: 0.3 MG/DL
BUN SERPL-MCNC: 11 MG/DL (ref 9.8–20.1)
CALCIUM SERPL-MCNC: 9.2 MG/DL (ref 8.4–10.2)
CHLORIDE SERPL-SCNC: 112 MMOL/L (ref 98–107)
CO2 SERPL-SCNC: 21 MMOL/L (ref 22–29)
COLOR UR: YELLOW
CREAT SERPL-MCNC: 1.23 MG/DL (ref 0.55–1.02)
EOSINOPHIL # BLD AUTO: 0.14 X10(3)/MCL (ref 0–0.9)
EOSINOPHIL NFR BLD AUTO: 2.2 %
ERYTHROCYTE [DISTWIDTH] IN BLOOD BY AUTOMATED COUNT: 12 % (ref 11.5–17)
FLUAV AG UPPER RESP QL IA.RAPID: NOT DETECTED
FLUBV AG UPPER RESP QL IA.RAPID: NOT DETECTED
GFR SERPLBLD CREATININE-BSD FMLA CKD-EPI: 52 MLS/MIN/1.73/M2
GLOBULIN SER-MCNC: 3.2 GM/DL (ref 2.4–3.5)
GLUCOSE SERPL-MCNC: 112 MG/DL (ref 74–100)
GLUCOSE UR QL STRIP.AUTO: NEGATIVE
HCT VFR BLD AUTO: 37.6 % (ref 37–47)
HGB BLD-MCNC: 12.3 G/DL (ref 12–16)
IMM GRANULOCYTES # BLD AUTO: 0.02 X10(3)/MCL (ref 0–0.04)
IMM GRANULOCYTES NFR BLD AUTO: 0.3 %
KETONES UR QL STRIP.AUTO: NEGATIVE
LACTATE SERPL-SCNC: 0.7 MMOL/L (ref 0.5–2.2)
LEUKOCYTE ESTERASE UR QL STRIP.AUTO: ABNORMAL
LYMPHOCYTES # BLD AUTO: 2.46 X10(3)/MCL (ref 0.6–4.6)
LYMPHOCYTES NFR BLD AUTO: 38.1 %
MCH RBC QN AUTO: 31.8 PG (ref 27–31)
MCHC RBC AUTO-ENTMCNC: 32.7 G/DL (ref 33–36)
MCV RBC AUTO: 97.2 FL (ref 80–94)
MONOCYTES # BLD AUTO: 0.4 X10(3)/MCL (ref 0.1–1.3)
MONOCYTES NFR BLD AUTO: 6.2 %
NEUTROPHILS # BLD AUTO: 3.34 X10(3)/MCL (ref 2.1–9.2)
NEUTROPHILS NFR BLD AUTO: 51.8 %
NITRITE UR QL STRIP.AUTO: NEGATIVE
PH UR STRIP.AUTO: 6.5 [PH]
PLATELET # BLD AUTO: 275 X10(3)/MCL (ref 130–400)
PMV BLD AUTO: 9.2 FL (ref 7.4–10.4)
POTASSIUM SERPL-SCNC: 3.6 MMOL/L (ref 3.5–5.1)
PROT SERPL-MCNC: 7.2 GM/DL (ref 6.4–8.3)
PROT UR QL STRIP.AUTO: NEGATIVE
RBC # BLD AUTO: 3.87 X10(6)/MCL (ref 4.2–5.4)
RBC #/AREA URNS AUTO: ABNORMAL /HPF
RBC UR QL AUTO: ABNORMAL
SARS-COV-2 RNA RESP QL NAA+PROBE: NOT DETECTED
SODIUM SERPL-SCNC: 141 MMOL/L (ref 136–145)
SP GR UR STRIP.AUTO: 1.01
SQUAMOUS #/AREA URNS AUTO: ABNORMAL /HPF
STREP A PCR (OHS): NOT DETECTED
UROBILINOGEN UR STRIP-ACNC: 0.2
WBC # SPEC AUTO: 6.45 X10(3)/MCL (ref 4.5–11.5)
WBC #/AREA URNS AUTO: >=100 /HPF

## 2023-07-12 PROCEDURE — 63600175 PHARM REV CODE 636 W HCPCS: Performed by: NURSE PRACTITIONER

## 2023-07-12 PROCEDURE — 0240U COVID/FLU A&B PCR: CPT | Performed by: NURSE PRACTITIONER

## 2023-07-12 PROCEDURE — 80053 COMPREHEN METABOLIC PANEL: CPT | Performed by: NURSE PRACTITIONER

## 2023-07-12 PROCEDURE — 87651 STREP A DNA AMP PROBE: CPT | Performed by: NURSE PRACTITIONER

## 2023-07-12 PROCEDURE — 81001 URINALYSIS AUTO W/SCOPE: CPT | Performed by: NURSE PRACTITIONER

## 2023-07-12 PROCEDURE — 85025 COMPLETE CBC W/AUTO DIFF WBC: CPT | Performed by: NURSE PRACTITIONER

## 2023-07-12 PROCEDURE — 87088 URINE BACTERIA CULTURE: CPT | Performed by: NURSE PRACTITIONER

## 2023-07-12 PROCEDURE — 83605 ASSAY OF LACTIC ACID: CPT | Performed by: NURSE PRACTITIONER

## 2023-07-12 PROCEDURE — 25000003 PHARM REV CODE 250: Performed by: NURSE PRACTITIONER

## 2023-07-12 PROCEDURE — 96372 THER/PROPH/DIAG INJ SC/IM: CPT | Performed by: NURSE PRACTITIONER

## 2023-07-12 PROCEDURE — 99284 EMERGENCY DEPT VISIT MOD MDM: CPT

## 2023-07-12 RX ORDER — DEXAMETHASONE SODIUM PHOSPHATE 4 MG/ML
4 INJECTION, SOLUTION INTRA-ARTICULAR; INTRALESIONAL; INTRAMUSCULAR; INTRAVENOUS; SOFT TISSUE
Status: DISCONTINUED | OUTPATIENT
Start: 2023-07-12 | End: 2023-07-12

## 2023-07-12 RX ORDER — CEFDINIR 300 MG/1
300 CAPSULE ORAL 2 TIMES DAILY
Qty: 20 CAPSULE | Refills: 0 | Status: SHIPPED | OUTPATIENT
Start: 2023-07-12 | End: 2023-07-22

## 2023-07-12 RX ORDER — LIDOCAINE HYDROCHLORIDE 10 MG/ML
2 INJECTION, SOLUTION EPIDURAL; INFILTRATION; INTRACAUDAL; PERINEURAL
Status: COMPLETED | OUTPATIENT
Start: 2023-07-12 | End: 2023-07-12

## 2023-07-12 RX ORDER — KETOROLAC TROMETHAMINE 30 MG/ML
30 INJECTION, SOLUTION INTRAMUSCULAR; INTRAVENOUS
Status: COMPLETED | OUTPATIENT
Start: 2023-07-12 | End: 2023-07-12

## 2023-07-12 RX ORDER — CEFTRIAXONE 1 G/1
1 INJECTION, POWDER, FOR SOLUTION INTRAMUSCULAR; INTRAVENOUS
Status: COMPLETED | OUTPATIENT
Start: 2023-07-12 | End: 2023-07-12

## 2023-07-12 RX ADMIN — KETOROLAC TROMETHAMINE 30 MG: 30 INJECTION, SOLUTION INTRAMUSCULAR; INTRAVENOUS at 04:07

## 2023-07-12 RX ADMIN — LIDOCAINE HYDROCHLORIDE 20 MG: 10 INJECTION, SOLUTION EPIDURAL; INFILTRATION; INTRACAUDAL; PERINEURAL at 05:07

## 2023-07-12 RX ADMIN — CEFTRIAXONE SODIUM 1 G: 1 INJECTION, POWDER, FOR SOLUTION INTRAMUSCULAR; INTRAVENOUS at 05:07

## 2023-07-12 NOTE — ED PROVIDER NOTES
"Encounter Date: 2023       History     Chief Complaint   Patient presents with    Headache     Generalized bodyaches, low grade fever, tired started 3 days ago.  Pt was seen at urgent care today, negative for covid and strep, pt was given a steroid inj. Today.      Patient is a 53-year-old female who presents emerged department complaints of generalized body aches low-grade temperature and feeling tired for 3 days.  She also has a headache and neck pain.  She states she had negative COVID and strep was given steroid injection Andres has not helped.  She denies any other complaints or associated symptoms.    Review of patient's allergies indicates:   Allergen Reactions    Latex Other (See Comments)     Other reaction(s): Blisters/Breaks down skin  "It eats through my skin"       Past Medical History:   Diagnosis Date    Anxiety disorder, unspecified     Depression      Past Surgical History:   Procedure Laterality Date    APPENDECTOMY      BACK SURGERY      x2     SECTION, CLASSIC      x4    CHOLECYSTECTOMY      COSMETIC SURGERY      reconstruction    TONSILLECTOMY       History reviewed. No pertinent family history.  Social History     Tobacco Use    Smoking status: Some Days     Types: Cigarettes    Smokeless tobacco: Never   Substance Use Topics    Alcohol use: Yes     Comment: occassional     Review of Systems   Constitutional:  Negative for activity change, appetite change and fever.   HENT:  Negative for congestion, dental problem and sore throat.    Eyes:  Negative for discharge and itching.   Respiratory:  Negative for apnea, chest tightness and shortness of breath.    Cardiovascular:  Negative for chest pain.   Gastrointestinal:  Negative for abdominal distention, abdominal pain and nausea.   Endocrine: Negative for cold intolerance and heat intolerance.   Genitourinary:  Positive for difficulty urinating. Negative for dysuria.   Musculoskeletal:  Negative for back pain.   Skin:  Negative for " rash.   Neurological:  Negative for dizziness, facial asymmetry and weakness.   Hematological:  Does not bruise/bleed easily.   Psychiatric/Behavioral:  Negative for agitation and behavioral problems.      Physical Exam     Initial Vitals [07/12/23 1446]   BP Pulse Resp Temp SpO2   96/62 80 18 99.2 °F (37.3 °C) 97 %      MAP       --         Physical Exam    Nursing note and vitals reviewed.  Constitutional: Vital signs are normal. She appears well-developed and well-nourished.  Non-toxic appearance. She does not have a sickly appearance.   HENT:   Head: Normocephalic and atraumatic.   Right Ear: External ear normal.   Left Ear: External ear normal.   Eyes: Conjunctivae, EOM and lids are normal. Pupils are equal, round, and reactive to light. Lids are everted and swept, no foreign bodies found.   Neck: Trachea normal and phonation normal. Neck supple. No thyroid mass and no thyromegaly present.   Normal range of motion.   Full passive range of motion without pain.     Cardiovascular:  Normal rate, regular rhythm, S1 normal, S2 normal, normal heart sounds, intact distal pulses and normal pulses.           Pulmonary/Chest: Breath sounds normal. No respiratory distress.   Musculoskeletal:         General: Normal range of motion.      Cervical back: Full passive range of motion without pain, normal range of motion and neck supple.      Comments: Pain make mainly localized to the paraspinal muscles in the neck.  No midline tenderness to palpation.  Negative axial load tenderness.     Lymphadenopathy:     She has no cervical adenopathy.   Neurological: She is alert and oriented to person, place, and time. She has normal strength. GCS score is 15. GCS eye subscore is 4. GCS verbal subscore is 5. GCS motor subscore is 6.   Skin: Skin is warm, dry and intact. Capillary refill takes less than 2 seconds.   Psychiatric: She has a normal mood and affect. Her speech is normal and behavior is normal. Judgment normal. Cognition and  memory are normal.       ED Course   Procedures  Labs Reviewed   COMPREHENSIVE METABOLIC PANEL - Abnormal; Notable for the following components:       Result Value    Chloride 112 (*)     Carbon Dioxide 21 (*)     Glucose Level 112 (*)     Creatinine 1.23 (*)     All other components within normal limits   URINALYSIS, REFLEX TO URINE CULTURE - Abnormal; Notable for the following components:    Blood, UA 2+ (*)     Leukocyte Esterase, UA 3+ (*)     All other components within normal limits   CBC WITH DIFFERENTIAL - Abnormal; Notable for the following components:    RBC 3.87 (*)     MCV 97.2 (*)     MCH 31.8 (*)     MCHC 32.7 (*)     All other components within normal limits   URINALYSIS, MICROSCOPIC - Abnormal; Notable for the following components:    Bacteria, UA Few (*)     WBC, UA >=100 (*)     Squamous Epithelial Cells, UA Few (*)     All other components within normal limits   LACTIC ACID, PLASMA - Normal   COVID/FLU A&B PCR - Normal    Narrative:     The Xpert Xpress SARS-CoV-2/FLU/RSV plus is a rapid, multiplexed real-time PCR test intended for the simultaneous qualitative detection and differentiation of SARS-CoV-2, Influenza A, Influenza B, and respiratory syncytial virus (RSV) viral RNA in either nasopharyngeal swab or nasal swab specimens.         STREP GROUP A BY PCR - Normal    Narrative:     The Xpert Xpress Strep A test is a rapid, qualitative in vitro diagnostic test for the detection of Streptococcus pyogenes (Group A ß-hemolytic Streptococcus, Strep A) in throat swab specimens from patients with signs and symptoms of pharyngitis.     CULTURE, URINE   CBC W/ AUTO DIFFERENTIAL    Narrative:     The following orders were created for panel order CBC auto differential.  Procedure                               Abnormality         Status                     ---------                               -----------         ------                     CBC with Differential[[id number]]        Abnormal             Final result                 Please view results for these tests on the individual orders.          Imaging Results    None          Medications   cefTRIAXone injection 1 g (has no administration in time range)   LIDOcaine (PF) 10 mg/ml (1%) injection 20 mg (has no administration in time range)   ketorolac injection 30 mg (30 mg Intramuscular Given 7/12/23 1604)                       Medical Decision Making  Patient is a 53-year-old female presents emerged department complaints of headache neck pain generalized body aches and low-grade temperature for the last few days.  After my 1st time assessing her she did not have any urinary complaints however after reviewing her urine I did speak to the patient again and she does report difficulty starting urination.    Problems Addressed:  Urinary tract infection without hematuria, site unspecified: acute illness or injury     Details: IM and p.o. medications sent to patient's pharmacy to continue outpatient basis.  Patient has a follow-up with the PCP on Friday which I discussed getting a urine sample to have test of clear for antibiotic therapy.  Patient was aware of plan of care including return emerged department should she have any changing worsening symptoms.    Amount and/or Complexity of Data Reviewed  External Data Reviewed: labs, radiology and notes.  Labs: ordered. Decision-making details documented in ED Course.            Clinical Impression:   Final diagnoses:  [G43.809] Other migraine without status migrainosus, not intractable (Primary)  [N39.0] Urinary tract infection without hematuria, site unspecified        ED Disposition Condition    Discharge Stable          ED Prescriptions       Medication Sig Dispense Start Date End Date Auth. Provider    cefdinir (OMNICEF) 300 MG capsule Take 1 capsule (300 mg total) by mouth 2 (two) times daily. for 10 days 20 capsule 7/12/2023 7/22/2023 ELIGIO Monroy          Follow-up Information       Follow up With  Specialties Details Why Contact Info    ELIGIO Harris  Schedule an appointment as soon as possible for a visit  As needed, For ER Follow Up. [address] [place] [address]  [phone]               ELIGIO Monroy  07/12/23 6887

## 2023-07-15 LAB — BACTERIA UR CULT: NO GROWTH

## 2023-08-05 ENCOUNTER — HOSPITAL ENCOUNTER (EMERGENCY)
Facility: HOSPITAL | Age: 53
Discharge: HOME | End: 2023-08-05
Attending: GENERAL ACUTE CARE HOSPITAL
Payer: MEDICAID

## 2023-08-05 VITALS
TEMPERATURE: 98 F | HEIGHT: 65 IN | SYSTOLIC BLOOD PRESSURE: 120 MMHG | WEIGHT: 139 LBS | RESPIRATION RATE: 18 BRPM | OXYGEN SATURATION: 98 % | HEART RATE: 86 BPM | DIASTOLIC BLOOD PRESSURE: 81 MMHG | BODY MASS INDEX: 23.16 KG/M2

## 2023-08-05 DIAGNOSIS — R11.0 NAUSEA: ICD-10-CM

## 2023-08-05 DIAGNOSIS — N30.01 ACUTE CYSTITIS WITH HEMATURIA: Primary | ICD-10-CM

## 2023-08-05 LAB
ALBUMIN SERPL-MCNC: 3.9 G/DL (ref 3.5–5)
ALBUMIN/GLOB SERPL: 1.1 RATIO (ref 1.1–2)
ALP SERPL-CCNC: 70 UNIT/L (ref 40–150)
ALT SERPL-CCNC: 40 UNIT/L (ref 0–55)
APPEARANCE UR: CLEAR
AST SERPL-CCNC: 45 UNIT/L (ref 5–34)
B-HCG SERPL QL: NEGATIVE
BACTERIA #/AREA URNS AUTO: NORMAL /HPF
BASOPHILS # BLD AUTO: 0.07 X10(3)/MCL
BASOPHILS NFR BLD AUTO: 1 %
BILIRUB UR QL STRIP.AUTO: NEGATIVE
BILIRUBIN DIRECT+TOT PNL SERPL-MCNC: 0.2 MG/DL
BUN SERPL-MCNC: 13 MG/DL (ref 9.8–20.1)
CALCIUM SERPL-MCNC: 9 MG/DL (ref 8.4–10.2)
CHLORIDE SERPL-SCNC: 110 MMOL/L (ref 98–107)
CO2 SERPL-SCNC: 20 MMOL/L (ref 22–29)
COLOR UR: YELLOW
CREAT SERPL-MCNC: 1.03 MG/DL (ref 0.55–1.02)
EOSINOPHIL # BLD AUTO: 0.19 X10(3)/MCL (ref 0–0.9)
EOSINOPHIL NFR BLD AUTO: 2.8 %
ERYTHROCYTE [DISTWIDTH] IN BLOOD BY AUTOMATED COUNT: 11.7 % (ref 11.5–17)
GFR SERPLBLD CREATININE-BSD FMLA CKD-EPI: >60 MLS/MIN/1.73/M2
GLOBULIN SER-MCNC: 3.6 GM/DL (ref 2.4–3.5)
GLUCOSE SERPL-MCNC: 92 MG/DL (ref 74–100)
GLUCOSE UR QL STRIP.AUTO: NEGATIVE
HCT VFR BLD AUTO: 38.3 % (ref 37–47)
HGB BLD-MCNC: 12.3 G/DL (ref 12–16)
IMM GRANULOCYTES # BLD AUTO: 0.03 X10(3)/MCL (ref 0–0.04)
IMM GRANULOCYTES NFR BLD AUTO: 0.4 %
KETONES UR QL STRIP.AUTO: NEGATIVE
LEUKOCYTE ESTERASE UR QL STRIP.AUTO: NEGATIVE
LYMPHOCYTES # BLD AUTO: 2.71 X10(3)/MCL (ref 0.6–4.6)
LYMPHOCYTES NFR BLD AUTO: 40.5 %
MCH RBC QN AUTO: 31.5 PG (ref 27–31)
MCHC RBC AUTO-ENTMCNC: 32.1 G/DL (ref 33–36)
MCV RBC AUTO: 98.2 FL (ref 80–94)
MONOCYTES # BLD AUTO: 0.42 X10(3)/MCL (ref 0.1–1.3)
MONOCYTES NFR BLD AUTO: 6.3 %
NEUTROPHILS # BLD AUTO: 3.27 X10(3)/MCL (ref 2.1–9.2)
NEUTROPHILS NFR BLD AUTO: 49 %
NITRITE UR QL STRIP.AUTO: NEGATIVE
PH UR STRIP.AUTO: 7 [PH]
PLATELET # BLD AUTO: 269 X10(3)/MCL (ref 130–400)
PMV BLD AUTO: 9 FL (ref 7.4–10.4)
POTASSIUM SERPL-SCNC: 4.1 MMOL/L (ref 3.5–5.1)
PROT SERPL-MCNC: 7.5 GM/DL (ref 6.4–8.3)
PROT UR QL STRIP.AUTO: NEGATIVE
RBC # BLD AUTO: 3.9 X10(6)/MCL (ref 4.2–5.4)
RBC #/AREA URNS AUTO: NORMAL /HPF
RBC UR QL AUTO: ABNORMAL
SODIUM SERPL-SCNC: 138 MMOL/L (ref 136–145)
SP GR UR STRIP.AUTO: 1.01
SQUAMOUS #/AREA URNS AUTO: NORMAL /HPF
UROBILINOGEN UR STRIP-ACNC: 0.2
WBC # SPEC AUTO: 6.69 X10(3)/MCL (ref 4.5–11.5)
WBC #/AREA URNS AUTO: NORMAL /HPF

## 2023-08-05 PROCEDURE — 63600175 PHARM REV CODE 636 W HCPCS: Performed by: PHYSICIAN ASSISTANT

## 2023-08-05 PROCEDURE — 81025 URINE PREGNANCY TEST: CPT | Performed by: PHYSICIAN ASSISTANT

## 2023-08-05 PROCEDURE — 81001 URINALYSIS AUTO W/SCOPE: CPT | Performed by: PHYSICIAN ASSISTANT

## 2023-08-05 PROCEDURE — 80053 COMPREHEN METABOLIC PANEL: CPT | Performed by: PHYSICIAN ASSISTANT

## 2023-08-05 PROCEDURE — 96372 THER/PROPH/DIAG INJ SC/IM: CPT | Performed by: PHYSICIAN ASSISTANT

## 2023-08-05 PROCEDURE — 85025 COMPLETE CBC W/AUTO DIFF WBC: CPT | Performed by: PHYSICIAN ASSISTANT

## 2023-08-05 PROCEDURE — 99285 EMERGENCY DEPT VISIT HI MDM: CPT | Mod: 25

## 2023-08-05 RX ORDER — KETOROLAC TROMETHAMINE 10 MG/1
10 TABLET, FILM COATED ORAL EVERY 8 HOURS
Qty: 15 TABLET | Refills: 0 | Status: SHIPPED | OUTPATIENT
Start: 2023-08-05 | End: 2023-08-10

## 2023-08-05 RX ORDER — PROMETHAZINE HYDROCHLORIDE 25 MG/1
25 TABLET ORAL EVERY 6 HOURS PRN
Qty: 15 TABLET | Refills: 0 | Status: SHIPPED | OUTPATIENT
Start: 2023-08-05

## 2023-08-05 RX ORDER — PROMETHAZINE HYDROCHLORIDE 25 MG/ML
25 INJECTION, SOLUTION INTRAMUSCULAR; INTRAVENOUS
Status: COMPLETED | OUTPATIENT
Start: 2023-08-05 | End: 2023-08-05

## 2023-08-05 RX ORDER — KETOROLAC TROMETHAMINE 30 MG/ML
60 INJECTION, SOLUTION INTRAMUSCULAR; INTRAVENOUS
Status: COMPLETED | OUTPATIENT
Start: 2023-08-05 | End: 2023-08-05

## 2023-08-05 RX ORDER — PHENAZOPYRIDINE HYDROCHLORIDE 200 MG/1
200 TABLET, FILM COATED ORAL 3 TIMES DAILY
Qty: 15 TABLET | Refills: 0 | Status: SHIPPED | OUTPATIENT
Start: 2023-08-05 | End: 2023-08-10

## 2023-08-05 RX ADMIN — PROMETHAZINE HYDROCHLORIDE 25 MG: 25 INJECTION INTRAMUSCULAR; INTRAVENOUS at 05:08

## 2023-08-05 RX ADMIN — KETOROLAC TROMETHAMINE 60 MG: 30 INJECTION, SOLUTION INTRAMUSCULAR at 05:08

## 2023-08-05 NOTE — ED PROVIDER NOTES
"Encounter Date: 2023       History     Chief Complaint   Patient presents with    Dysuria     Burning/pain with urination, feeling like she cant empty bladder. Nausea and vomiting started today. Fever at home up to 100.2, treated with motrin. Dx with UTI 2 weeks ago and finished abx but feels worse     53-year-old female with a history of anxiety, depression, and IBS presents to the ED with left flank pain onset 5 days ago with dysuria as well as nausea and vomiting. Says she feels as though she cannot empty her bladder. States that a few weeks ago, she was seen in the ER, so she would a UTI.  States she was sent home on antibiotics which she completed however notes some of her symptoms never resolved.  States that she took ibuprofen around 1500 today for a low grade temp. Also took Zofran PTA however notes no relief of her nausea. No previous history of kidney stones     The history is provided by the patient. No  was used.     Review of patient's allergies indicates:   Allergen Reactions    Latex Other (See Comments)     Other reaction(s): Blisters/Breaks down skin  "It eats through my skin"       Past Medical History:   Diagnosis Date    Anxiety disorder, unspecified     Depression      Past Surgical History:   Procedure Laterality Date    APPENDECTOMY      BACK SURGERY      x2     SECTION, CLASSIC      x4    CHOLECYSTECTOMY      COSMETIC SURGERY      reconstruction    TONSILLECTOMY       No family history on file.  Social History     Tobacco Use    Smoking status: Some Days     Current packs/day: 0.00     Types: Cigarettes    Smokeless tobacco: Never   Substance Use Topics    Alcohol use: Yes     Comment: occassional     Review of Systems   Constitutional:  Positive for fever. Negative for chills.   Eyes:  Negative for visual disturbance.   Respiratory:  Negative for cough and shortness of breath.    Cardiovascular:  Negative for chest pain.   Gastrointestinal:  Positive for " nausea and vomiting. Negative for abdominal pain.   Genitourinary:  Positive for dysuria, flank pain and frequency. Negative for decreased urine volume, difficulty urinating and hematuria.   Musculoskeletal:  Negative for arthralgias.   Skin:  Negative for color change and rash.   Neurological:  Negative for dizziness and headaches.   Psychiatric/Behavioral:  Negative for behavioral problems.    All other systems reviewed and are negative.      Physical Exam     Initial Vitals [08/05/23 1707]   BP Pulse Resp Temp SpO2   120/81 86 18 98.4 °F (36.9 °C) 98 %      MAP       --         Physical Exam    Nursing note and vitals reviewed.  Constitutional: She appears well-developed and well-nourished.   HENT:   Head: Normocephalic and atraumatic.   Eyes: EOM are normal. Pupils are equal, round, and reactive to light.   Neck: Neck supple.   Cardiovascular:  Normal rate, regular rhythm and normal heart sounds.           Pulmonary/Chest: Breath sounds normal.   Abdominal: Abdomen is soft. Bowel sounds are normal. She exhibits no distension. There is abdominal tenderness (Some mild left flank tenderness).   No right CVA tenderness.  No left CVA tenderness (mild). There is no rebound and no guarding.   Musculoskeletal:         General: Normal range of motion.      Cervical back: Neck supple.     Neurological: She is alert and oriented to person, place, and time. She has normal strength. GCS score is 15. GCS eye subscore is 4. GCS verbal subscore is 5. GCS motor subscore is 6.   Skin: Skin is warm and dry.   Psychiatric: She has a normal mood and affect.         ED Course   Procedures  Labs Reviewed   URINALYSIS, REFLEX TO URINE CULTURE - Abnormal; Notable for the following components:       Result Value    Blood, UA 1+ (*)     All other components within normal limits   COMPREHENSIVE METABOLIC PANEL - Abnormal; Notable for the following components:    Chloride 110 (*)     Carbon Dioxide 20 (*)     Creatinine 1.03 (*)      Globulin 3.6 (*)     Aspartate Aminotransferase 45 (*)     All other components within normal limits   CBC WITH DIFFERENTIAL - Abnormal; Notable for the following components:    RBC 3.90 (*)     MCV 98.2 (*)     MCH 31.5 (*)     MCHC 32.1 (*)     All other components within normal limits   PREGNANCY TEST, URINE RAPID - Normal   URINALYSIS, MICROSCOPIC - Normal   CBC W/ AUTO DIFFERENTIAL    Narrative:     The following orders were created for panel order CBC auto differential.  Procedure                               Abnormality         Status                     ---------                               -----------         ------                     CBC with Differential[[id number]]        Abnormal            Final result                 Please view results for these tests on the individual orders.          Imaging Results              CT Renal Stone Study ABD Pelvis WO (Final result)  Result time 08/05/23 18:56:41      Final result by Leonardo Jhaveri MD (08/05/23 18:56:41)                   Impression:      No acute abnormality of the abdomen and pelvis.      Electronically signed by: Leonardo Jhaveri MD  Date:    08/05/2023  Time:    18:56               Narrative:    EXAMINATION:  CT RENAL STONE STUDY ABD PELVIS WO    CLINICAL HISTORY:  Flank pain, kidney stone suspected;    TECHNIQUE:  Axial images of the abdomen and pelvis were obtained without IV contrast administration.  Coronal and sagittal reconstructions were provided.  Three dimensional and MIP images were obtained and evaluated.  Total DLP was 194 mGy-cm. Dose lowering technique and automated exposure control were utilized for this exam.    COMPARISON:  None    FINDINGS:  The bilateral lung bases are clear.  The heart is normal in size.    The liver is homogeneous.  The gallbladder is surgically absent.  The spleen, pancreas, and adrenal glands are normal.  There is a simple cyst in the left kidney which requires no dedicated follow-up.  The right kidney is normal.   There is no hydronephrosis or nephrolithiasis.    The stomach and small bowel are decompressed.  The appendix is not visualized.  The colon is normal.  The urinary bladder is normal.  There is no pelvic or retroperitoneal lymphadenopathy.  The aorta is nonaneurysmal.  There is no lytic or blastic osseous lesion.                                       Medications   promethazine injection 25 mg (25 mg Intramuscular Given 8/5/23 1741)   ketorolac injection 60 mg (60 mg Intramuscular Given 8/5/23 1745)     Medical Decision Making:   Initial Assessment:   53-year-old female with a history of anxiety, depression, and IBS presents to the ED with left flank pain onset 5 days ago with dysuria as well as nausea and vomiting. Says she feels as though she cannot empty her bladder. States that a few weeks ago, she was seen in the ER, so she would a UTI.  States she was sent home on antibiotics which she completed however notes some of her symptoms never resolved.  States that she took ibuprofen around 1500 today for a low grade temp. Also took Zofran PTA however notes no relief of her nausea. No previous history of kidney stones     Differential Diagnosis:   Nephrolithiasis, pyelonephritis, urinary tract infection, cystitis, constipation, diarrhea, IBS, gastritis, gastroenteritis, viral syndrome  Clinical Tests:   Lab Tests: Reviewed and Ordered             ED Course as of 08/05/23 1907   Sat Aug 05, 2023   1808 Patient notes pain and nausea better after medication.  Discussed that there is blood in her urine which is consistent with a kidney stone secondary to her not having menstrual cycles.  I did discuss that she recently had a CT scan a month ago and that I do not want to order another 1 without talking to her 1st.  Patient states she would rather get the scan and no for sure and go home and not know.  Will order CT scan renal stone  [MA]   1901 Discussed all labs and imaging results with patient. Will treat for cystitis  symptoms; f/u with PCP next week if symptoms persist due to benign ER work-up [MA]      ED Course User Index  [MA] Romeo Chinchilla PA-C                 Clinical Impression:   Final diagnoses:  [N30.01] Acute cystitis with hematuria (Primary)  [R11.0] Nausea        ED Disposition Condition    Discharge Stable          ED Prescriptions       Medication Sig Dispense Start Date End Date Auth. Provider    phenazopyridine (PYRIDIUM) 200 MG tablet Take 1 tablet (200 mg total) by mouth 3 (three) times daily. for 5 days 15 tablet 8/5/2023 8/10/2023 Romeo Chinchilla PA-C    promethazine (PHENERGAN) 25 MG tablet Take 1 tablet (25 mg total) by mouth every 6 (six) hours as needed for Nausea. 15 tablet 8/5/2023 -- Romeo Chinchilla PA-C    ketorolac (TORADOL) 10 mg tablet Take 1 tablet (10 mg total) by mouth every 8 (eight) hours. for 5 days 15 tablet 8/5/2023 8/10/2023 Romeo Chinchilla PA-C          Follow-up Information       Follow up With Specialties Details Why Contact Info    Anastacio Boyle FNP  Call in 3 days If symptoms worsen [address]  [place] [id number] [phone]      [facility] Emergency Medicine In 1 week If symptoms worsen [address]  [facility] [id number] [phone]             Romeo Chinchilla PA-C  08/05/23 7935

## 2023-09-18 LAB
CHLORIDE SERPL-SCNC: 107 MMOL/L (ref 99–106)
CHOLEST SERPL-MSCNC: 181 MG/DL (ref 0–200)
CO2 SERPL-SCNC: 19 MMOL/L (ref 20–29)
CREAT SERPL-MCNC: 1.3 MG/DL (ref 0.6–1)
EGFR: 51 (ref 59–?)
GLUCOSE SERPL-MCNC: 106 MG/DL (ref 70–99)
HBA1C MFR BLD: 5.1 % (ref 4–6)
HDLC SERPL-MCNC: 62 MG/DL (ref 35–70)
LDLC SERPL CALC-MCNC: 86 MG/DL (ref 0–160)
TRIGL SERPL-MCNC: 195 MG/DL (ref 40–160)

## 2023-09-25 ENCOUNTER — HOSPITAL ENCOUNTER (EMERGENCY)
Facility: HOSPITAL | Age: 53
Discharge: HOME | End: 2023-09-25
Attending: EMERGENCY MEDICINE
Payer: MEDICAID

## 2023-09-25 VITALS
BODY MASS INDEX: 23.49 KG/M2 | SYSTOLIC BLOOD PRESSURE: 129 MMHG | HEART RATE: 83 BPM | OXYGEN SATURATION: 98 % | TEMPERATURE: 99 F | RESPIRATION RATE: 16 BRPM | DIASTOLIC BLOOD PRESSURE: 76 MMHG | HEIGHT: 65 IN | WEIGHT: 141 LBS

## 2023-09-25 DIAGNOSIS — G43.909 MIGRAINE WITHOUT STATUS MIGRAINOSUS, NOT INTRACTABLE, UNSPECIFIED MIGRAINE TYPE: Primary | ICD-10-CM

## 2023-09-25 PROCEDURE — 25000003 PHARM REV CODE 250: Performed by: NURSE PRACTITIONER

## 2023-09-25 PROCEDURE — 99284 EMERGENCY DEPT VISIT MOD MDM: CPT

## 2023-09-25 PROCEDURE — 63600175 PHARM REV CODE 636 W HCPCS: Performed by: NURSE PRACTITIONER

## 2023-09-25 PROCEDURE — 96372 THER/PROPH/DIAG INJ SC/IM: CPT | Performed by: NURSE PRACTITIONER

## 2023-09-25 RX ORDER — DEXAMETHASONE SODIUM PHOSPHATE 4 MG/ML
4 INJECTION, SOLUTION INTRA-ARTICULAR; INTRALESIONAL; INTRAMUSCULAR; INTRAVENOUS; SOFT TISSUE
Status: COMPLETED | OUTPATIENT
Start: 2023-09-25 | End: 2023-09-25

## 2023-09-25 RX ORDER — LORAZEPAM 1 MG/1
2 TABLET ORAL
Status: COMPLETED | OUTPATIENT
Start: 2023-09-25 | End: 2023-09-25

## 2023-09-25 RX ORDER — KETOROLAC TROMETHAMINE 30 MG/ML
30 INJECTION, SOLUTION INTRAMUSCULAR; INTRAVENOUS
Status: COMPLETED | OUTPATIENT
Start: 2023-09-25 | End: 2023-09-25

## 2023-09-25 RX ORDER — PROCHLORPERAZINE EDISYLATE 5 MG/ML
10 INJECTION INTRAMUSCULAR; INTRAVENOUS
Status: COMPLETED | OUTPATIENT
Start: 2023-09-25 | End: 2023-09-25

## 2023-09-25 RX ORDER — SUMATRIPTAN SUCCINATE 25 MG/1
50 TABLET ORAL 2 TIMES DAILY
Qty: 48 TABLET | Refills: 0 | Status: SHIPPED | OUTPATIENT
Start: 2023-09-25 | End: 2023-10-07

## 2023-09-25 RX ORDER — SUMATRIPTAN 50 MG/1
50 TABLET, FILM COATED ORAL ONCE
Status: COMPLETED | OUTPATIENT
Start: 2023-09-25 | End: 2023-09-25

## 2023-09-25 RX ADMIN — LORAZEPAM 2 MG: 1 TABLET ORAL at 04:09

## 2023-09-25 RX ADMIN — DEXAMETHASONE SODIUM PHOSPHATE 4 MG: 4 INJECTION, SOLUTION INTRA-ARTICULAR; INTRALESIONAL; INTRAMUSCULAR; INTRAVENOUS; SOFT TISSUE at 03:09

## 2023-09-25 RX ADMIN — PROCHLORPERAZINE EDISYLATE 10 MG: 5 INJECTION INTRAMUSCULAR; INTRAVENOUS at 03:09

## 2023-09-25 RX ADMIN — SUMATRIPTAN SUCCINATE 50 MG: 50 TABLET ORAL at 03:09

## 2023-09-25 RX ADMIN — KETOROLAC TROMETHAMINE 30 MG: 30 INJECTION, SOLUTION INTRAMUSCULAR; INTRAVENOUS at 03:09

## 2023-09-25 NOTE — ED PROVIDER NOTES
"Encounter Date: 2023       History     Chief Complaint   Patient presents with    Headache     Pt c/o migraine x3 days. Also reports nausea, diarrhea, and sensitivity to light. States these are typical migraine symptoms for her, and states she has been taking butalbital with little relief.     Patient is a 53-year-old female who presents emerged department with a migraine headache for 3 days.  She also reports some nausea sensitivity to light and sound.  She has history of migraines and normally takes butalbital at home and normally alleviates the pain but states that the pain only went away for a day or so and came back.  She states nothing is helping the pain at this time.  She denies any fevers chills.  She denies any other complaints associated symptoms at this time.  She states the pain and presentation is typical to her migraine presentation.      Review of patient's allergies indicates:   Allergen Reactions    Latex Other (See Comments)     Other reaction(s): Blisters/Breaks down skin  "It eats through my skin"       Past Medical History:   Diagnosis Date    Anxiety disorder, unspecified     Depression      Past Surgical History:   Procedure Laterality Date    APPENDECTOMY      BACK SURGERY      x2     SECTION, CLASSIC      x4    CHOLECYSTECTOMY      COSMETIC SURGERY      reconstruction    TONSILLECTOMY       History reviewed. No pertinent family history.  Social History     Tobacco Use    Smoking status: Some Days     Types: Cigarettes    Smokeless tobacco: Never   Substance Use Topics    Alcohol use: Yes     Comment: occassional     Review of Systems   Constitutional:  Negative for activity change, appetite change and fever.   HENT:  Positive for postnasal drip. Negative for congestion, dental problem and sore throat.    Eyes:  Negative for discharge and itching.   Respiratory:  Negative for apnea, chest tightness and shortness of breath.    Cardiovascular:  Negative for chest pain. "   Gastrointestinal:  Positive for nausea. Negative for abdominal distention, abdominal pain, diarrhea and vomiting.   Genitourinary:  Negative for dysuria.   Musculoskeletal:  Negative for back pain.   Skin:  Negative for rash.   Neurological:  Positive for headaches. Negative for dizziness, seizures, facial asymmetry and weakness.   Hematological:  Does not bruise/bleed easily.   Psychiatric/Behavioral:  Negative for agitation and behavioral problems.    All other systems reviewed and are negative.      Physical Exam     Initial Vitals [09/25/23 1449]   BP Pulse Resp Temp SpO2   120/78 78 16 99.4 °F (37.4 °C) 96 %      MAP       --         Physical Exam    Nursing note and vitals reviewed.  Constitutional: Vital signs are normal. She appears well-developed and well-nourished.  Non-toxic appearance. She does not have a sickly appearance.   HENT:   Head: Normocephalic and atraumatic.   Right Ear: External ear normal.   Left Ear: External ear normal.   Mouth/Throat: Oropharynx is clear and moist.   Some clear fluid bubbles to the right TM but no bulging no loss of landmarks.   Eyes: Conjunctivae, EOM and lids are normal. Pupils are equal, round, and reactive to light. Lids are everted and swept, no foreign bodies found.   Neck: Trachea normal and phonation normal. Neck supple. No thyroid mass and no thyromegaly present.   Normal range of motion.   Full passive range of motion without pain.     Cardiovascular:  Normal rate, regular rhythm, S1 normal, S2 normal, normal heart sounds, intact distal pulses and normal pulses.           Pulmonary/Chest: Breath sounds normal.   Abdominal: Abdomen is soft. She exhibits no distension.   Musculoskeletal:         General: Normal range of motion.      Cervical back: Full passive range of motion without pain, normal range of motion and neck supple.     Lymphadenopathy:     She has no cervical adenopathy.   Neurological: She is alert and oriented to person, place, and time. She has  normal strength. GCS score is 15. GCS eye subscore is 4. GCS verbal subscore is 5. GCS motor subscore is 6.   Skin: Skin is warm, dry and intact. Capillary refill takes less than 2 seconds.   Psychiatric: She has a normal mood and affect. Her speech is normal and behavior is normal. Judgment normal. Cognition and memory are normal.         ED Course   Procedures  Labs Reviewed - No data to display       Imaging Results    None          Medications   ketorolac injection 30 mg (30 mg Intramuscular Given 9/25/23 1511)   dexAMETHasone injection 4 mg (4 mg Intramuscular Given 9/25/23 1512)   prochlorperazine injection Soln 10 mg (10 mg Intramuscular Given 9/25/23 1512)   sumatriptan tablet 50 mg (50 mg Oral Given 9/25/23 1546)   LORazepam tablet 2 mg (2 mg Oral Given 9/25/23 1629)     Medical Decision Making  Patient is a 53-year-old female presents emerged department complaints of migraine headache.  She states localized to the right lower head and she does have associated sensitivity to light and sound.  She has had nausea with this.  She denies any fevers chills.  She has had some postnasal drip in the last several days.  She denies any other complaints associated symptoms at this time.    Problems Addressed:  Migraine without status migrainosus, not intractable, unspecified migraine type: acute illness or injury     Details: IM and p.o. medications given here which did relieve headache.  Sent home with oral medication to continue.  Strict ED return precautions discussed for any change or worsening symptoms.    Amount and/or Complexity of Data Reviewed  External Data Reviewed: labs, radiology and notes.    Risk  Prescription drug management.               ED Course as of 09/25/23 1709   Mon Sep 25, 2023   1625 Patient has had IM medications as well as p.o. medication for migraine headache which did not provide relief.  She begin stating that she had tightness in her chest and felt like she was trembling but denied  feeling anxious.  Will give additional p.o. medication to help the patient relax and evaluate headache after. [SL]   1704 Patient states she is feeling much better after the last medication she was given.  Discussed sending home with Imitrex to continue as needed for headaches.  Discussed strict ED return precautions. [SL]      ED Course User Index  [SL] Jermaine Jimenez FNP                    Clinical Impression:   Final diagnoses:  [G43.909] Migraine without status migrainosus, not intractable, unspecified migraine type (Primary)        ED Disposition Condition    Discharge Stable          ED Prescriptions       Medication Sig Dispense Start Date End Date Auth. Provider    sumatriptan (IMITREX) 25 MG Tab Take 2 tablets (50 mg total) by mouth 2 (two) times a day. Take 1 dose at onset of headache.  May repeat x1 dose in 2 hours if headache not relieved. for 12 days 48 tablet 9/25/2023 10/7/2023 Jermaine Jimenez FNP          Follow-up Information       Follow up With Specialties Details Why Contact Info    Anastacio Boyle FNP  Schedule an appointment as soon as possible for a visit  As needed, For ER Follow Up. [address] [place] [address]  [phone]               Jermaine Jimenez FNP  09/25/23 4198

## 2024-01-25 ENCOUNTER — HOSPITAL ENCOUNTER (EMERGENCY)
Facility: HOSPITAL | Age: 54
Discharge: HOME | End: 2024-01-25
Attending: EMERGENCY MEDICINE
Payer: MEDICAID

## 2024-01-25 VITALS
DIASTOLIC BLOOD PRESSURE: 74 MMHG | HEART RATE: 81 BPM | HEIGHT: 65 IN | TEMPERATURE: 99 F | OXYGEN SATURATION: 98 % | SYSTOLIC BLOOD PRESSURE: 118 MMHG | BODY MASS INDEX: 23.49 KG/M2 | WEIGHT: 141 LBS | RESPIRATION RATE: 16 BRPM

## 2024-01-25 DIAGNOSIS — S62.357D CLOSED NONDISPLACED FRACTURE OF SHAFT OF FIFTH METACARPAL BONE OF LEFT HAND WITH ROUTINE HEALING, SUBSEQUENT ENCOUNTER: Primary | ICD-10-CM

## 2024-01-25 PROCEDURE — 99283 EMERGENCY DEPT VISIT LOW MDM: CPT | Mod: 25

## 2024-01-25 PROCEDURE — 25000003 PHARM REV CODE 250: Performed by: EMERGENCY MEDICINE

## 2024-01-25 PROCEDURE — 29125 APPL SHORT ARM SPLINT STATIC: CPT | Mod: LT

## 2024-01-25 RX ORDER — HYDROCODONE BITARTRATE AND ACETAMINOPHEN 10; 325 MG/1; MG/1
1 TABLET ORAL EVERY 6 HOURS PRN
Qty: 20 TABLET | Refills: 0 | Status: SHIPPED | OUTPATIENT
Start: 2024-01-25 | End: 2024-01-25

## 2024-01-25 RX ORDER — HYDROCODONE BITARTRATE AND ACETAMINOPHEN 10; 325 MG/1; MG/1
1 TABLET ORAL EVERY 6 HOURS PRN
Status: DISCONTINUED | OUTPATIENT
Start: 2024-01-25 | End: 2024-01-25 | Stop reason: HOSPADM

## 2024-01-25 RX ORDER — HYDROCODONE BITARTRATE AND ACETAMINOPHEN 10; 325 MG/1; MG/1
1 TABLET ORAL EVERY 6 HOURS PRN
Qty: 20 TABLET | Refills: 0 | OUTPATIENT
Start: 2024-01-25 | End: 2024-03-29

## 2024-01-25 RX ADMIN — HYDROCODONE BITARTRATE AND ACETAMINOPHEN 1 TABLET: 10; 325 TABLET ORAL at 01:01

## 2024-01-25 NOTE — DISCHARGE INSTRUCTIONS
Please keep your appointment to see Dr. Velazco orthopedic surgeon tomorrow    Elevate that hand keep a cool compress on it 30 minutes out of every 3-4 hours    Wear the sling if it is more comfortable in the sling it will help keep the hand elevated

## 2024-01-25 NOTE — ED PROVIDER NOTES
"Encounter Date: 2024       History     Chief Complaint   Patient presents with    Hand Injury     Pt states she broke her left hand three days ago and was just approved by workers comp to have a cast put on a would like her hand to be re x rayed because she has gone so many days without a cast.     Approximally 48 hrs ago on Tuesday she said her left hand got caught in a door that was being shut.  And she has pain on the little finger side of the hand in the metacarpal she said he has been x-rayed she has a fracture there.  She said she called workman's comp and they told her to come get a cast put on it.  She had a Velcro splint placed at [place] [facility] when they diagnosed with a fracture apparently on Tuesday.  She denies prior hand injuries that she can remember  She is dominant right-handed  Has an appointment tomorrow to see the orthopedic surgeon Dr. Tyrone Velazco.  Does not use tobacco does drink occasionally no drugs.  COVID shots yes no pneumonia no flu no tetanus.  She takes medications for cholesterol depression and anxiety.  She has had  x4 cholecystectomy tonsillectomy hysterectomy.  Back surgery x2.   4 para 4 no miscarriages.  HALEIGH Bowen MD  primary care sees JIGAR Boyle at the office        Review of patient's allergies indicates:   Allergen Reactions    Latex Other (See Comments)     Other reaction(s): Blisters/Breaks down skin  "It eats through my skin"       Past Medical History:   Diagnosis Date    Anxiety disorder, unspecified     Depression      Past Surgical History:   Procedure Laterality Date    APPENDECTOMY      BACK SURGERY      x2     SECTION, CLASSIC      x4    CHOLECYSTECTOMY      COSMETIC SURGERY      reconstruction    TONSILLECTOMY       No family history on file.  Social History     Tobacco Use    Smoking status: Some Days     Types: Cigarettes    Smokeless tobacco: Never   Substance Use Topics    Alcohol use: Yes     Comment: occassional "     Review of Systems   Constitutional: Negative.    HENT: Negative.     Eyes: Negative.    Respiratory: Negative.     Cardiovascular: Negative.    Gastrointestinal: Negative.    Endocrine: Negative.    Genitourinary: Negative.    Musculoskeletal:  Positive for joint swelling (Pain hypothenar area left hand non dominant hand no complaints otherwise except the pain occasionally shoots up the arm).   Skin: Negative.    Allergic/Immunologic: Negative.    Neurological: Negative.    Hematological: Negative.    Psychiatric/Behavioral: Negative.     All other systems reviewed and are negative.      Physical Exam     Initial Vitals [01/25/24 1025]   BP Pulse Resp Temp SpO2   120/77 86 18 98.5 °F (36.9 °C) 96 %      MAP       --         Physical Exam    Nursing note and vitals reviewed.  Constitutional: She appears well-developed and well-nourished.   Pleasant lady nondistressed right handed  looks to be uncomfortable with that left hand in the Velcro splint however   HENT:   Head: Normocephalic and atraumatic.   Musculoskeletal:         General: Tenderness present.      Comments: The left hand is tender on the midshaft 5th metacarpal.  She has intact median radial ulnar nerve function of the hand the remainder for med carpal bones are not tender there is no dropping of the knuckle per se.  She can extend her fingers out straight when she flexes the fingers.  There is no rotational deficits of that 5th finger into the palm.  It comes down normally.  Same as on the right side.  Interosseous muscles add an abduction are normal.  Radial ulnar pulses are good.  No tenderness at the ulnar styloid.     Neurological: She is alert and oriented to person, place, and time. GCS score is 15. GCS eye subscore is 4. GCS verbal subscore is 5. GCS motor subscore is 6.   Skin: Capillary refill takes less than 2 seconds.         ED Course   Procedures  Labs Reviewed - No data to display       Imaging Results              X-Ray Hand 3 view  Left (Preliminary result)  Result time 01/25/24 12:54:51      Wet Read by Prateek Ross MD (01/25/24 12:54:51, [facility], Emergency Medicine)    Midshaft metacarpal fracture displaced closed                                     Medications   HYDROcodone-acetaminophen  mg per tablet 1 tablet (1 tablet Oral Given 1/25/24 1306)     Medical Decision Making  Amount and/or Complexity of Data Reviewed  Radiology: ordered and independent interpretation performed.     Details: Mid shaft mc fx displaced  Discussion of management or test interpretation with external provider(s): Differential diagnosis include midshaft metacarpal fracture closed versus open, angulated enough to cause rotational deficits or not angulated enough to cause rotational deficits    Risk  Prescription drug management.  Risk Details: MDM  Problems addressed  Co-morbidities and/or factors adding to the complexity or risk for the patient:  None known  Problems addressed:  48 hour old metacarpal fracture without pain medication or ulnar splint applied  Acute problem/illness or progression/exacerbation of chronic problem with potential threat to life/bodily dysfunction?:  None known  Differential diagnoses/problems considered: see above     Amount and/or Complexity of Data Reviewed  Independent Historian: none (see above for summary)  External Data Reviewed: no prior records available for review  (see above for summary)  Risk and benefits of testing: discussed   Labs: Labs: ordered and reviewed  Radiology:Radiology: ordered and independent interpretation performed (see above or ED course)  ECG/medicine tests:Radiology: ordered and independent interpretation performed (see above or ED course)  none    Risk  Prescription drug management   Parenteral controlled substances   Diagnosis or treatment significantly impacted by social determinants of health: none   Shared decision making     Critical Care  none     Critical  Care  Total time providing critical care: 0 minutes               ED Course as of 01/25/24 1323   Thu Jan 25, 2024   1254 On examination patient has no rotational deficit of that finger little finger it comes down straight when she flexes and extends her finger it stays straight inappropriate alignment with the metacarpals.  I explained to her usually angulation is an obvious indication for fracture but with this much displacement she may have surgery for it any house see the orthopedic doctor discuss her options [DM]   1255 Patient has tolerated Norco before.  We had the narcotics discussion she has not driving her father is she is given 1 orally now [DM]      ED Course User Index  [DM] Prateek Ross MD                           Clinical Impression:  Final diagnoses:  [S62.357D] Closed nondisplaced fracture of shaft of fifth metacarpal bone of left hand with routine healing, subsequent encounter (Primary)          ED Disposition Condition    Discharge Stable          ED Prescriptions       Medication Sig Dispense Start Date End Date Auth. Provider    HYDROcodone-acetaminophen (NORCO)  mg per tablet  (Status: Discontinued) Take 1 tablet by mouth every 6 (six) hours as needed for Pain (Do not drive after using medication at least 8 hours). 20 tablet 1/25/2024 1/25/2024 Prateek Ross MD    HYDROcodone-acetaminophen (NORCO)  mg per tablet Take 1 tablet by mouth every 6 (six) hours as needed for Pain (Do not drive after using medication at least 8 hours). 20 tablet 1/25/2024 -- Prateek Ross MD          Follow-up Information       Follow up With Specialties Details Why Contact Info    Anastacio Boyle FNP  In 1 week As needed [address] [place] [address]  [phone]               Prateek Ross MD  01/25/24 1320       Prateek Ross MD  01/25/24 1321

## 2024-02-28 ENCOUNTER — LAB VISIT (OUTPATIENT)
Dept: LAB | Facility: HOSPITAL | Age: 54
End: 2024-02-28
Attending: NURSE PRACTITIONER
Payer: MEDICAID

## 2024-02-28 DIAGNOSIS — Z36.3 ENCOUNTER FOR ROUTINE SCREENING FOR MALFORMATION USING ULTRASONICS: Primary | ICD-10-CM

## 2024-02-28 DIAGNOSIS — Z01.812 PRE-OPERATIVE LABORATORY EXAMINATION: ICD-10-CM

## 2024-02-28 DIAGNOSIS — E78.5 HYPERLIPIDEMIA, UNSPECIFIED HYPERLIPIDEMIA TYPE: ICD-10-CM

## 2024-02-28 LAB
ALBUMIN SERPL-MCNC: 3.9 G/DL (ref 3.5–5)
ALBUMIN/GLOB SERPL: 1.2 RATIO (ref 1.1–2)
ALP SERPL-CCNC: 126 UNIT/L (ref 40–150)
ALT SERPL-CCNC: 107 UNIT/L (ref 0–55)
AST SERPL-CCNC: 128 UNIT/L (ref 5–34)
BILIRUB SERPL-MCNC: 0.5 MG/DL
BUN SERPL-MCNC: 13 MG/DL (ref 9.8–20.1)
CALCIUM SERPL-MCNC: 8.8 MG/DL (ref 8.4–10.2)
CHLORIDE SERPL-SCNC: 111 MMOL/L (ref 98–107)
CHOLEST SERPL-MCNC: 150 MG/DL
CHOLEST/HDLC SERPL: 3 {RATIO} (ref 0–5)
CO2 SERPL-SCNC: 21 MMOL/L (ref 22–29)
CREAT SERPL-MCNC: 1.24 MG/DL (ref 0.55–1.02)
DEPRECATED CALCIDIOL+CALCIFEROL SERPL-MC: 30.9 NG/ML (ref 30–80)
ERYTHROCYTE [DISTWIDTH] IN BLOOD BY AUTOMATED COUNT: 12.3 % (ref 11.5–17)
EST. AVERAGE GLUCOSE BLD GHB EST-MCNC: 96.8 MG/DL
GFR SERPLBLD CREATININE-BSD FMLA CKD-EPI: 52 MLS/MIN/1.73/M2
GLOBULIN SER-MCNC: 3.3 GM/DL (ref 2.4–3.5)
GLUCOSE SERPL-MCNC: 101 MG/DL (ref 74–100)
HBA1C MFR BLD: 5 %
HCT VFR BLD AUTO: 38.6 % (ref 37–47)
HDLC SERPL-MCNC: 54 MG/DL (ref 35–60)
HGB BLD-MCNC: 12.3 G/DL (ref 12–16)
LDLC SERPL CALC-MCNC: 65 MG/DL (ref 50–140)
MCH RBC QN AUTO: 31.4 PG (ref 27–31)
MCHC RBC AUTO-ENTMCNC: 31.9 G/DL (ref 33–36)
MCV RBC AUTO: 98.5 FL (ref 80–94)
PLATELET # BLD AUTO: 240 X10(3)/MCL (ref 130–400)
PMV BLD AUTO: 9.1 FL (ref 7.4–10.4)
POTASSIUM SERPL-SCNC: 3.6 MMOL/L (ref 3.5–5.1)
PROT SERPL-MCNC: 7.2 GM/DL (ref 6.4–8.3)
RBC # BLD AUTO: 3.92 X10(6)/MCL (ref 4.2–5.4)
SODIUM SERPL-SCNC: 139 MMOL/L (ref 136–145)
TRIGL SERPL-MCNC: 157 MG/DL (ref 37–140)
TSH SERPL-ACNC: 1.38 UIU/ML (ref 0.35–4.94)
VIT B12 SERPL-MCNC: 499 PG/ML (ref 213–816)
VLDLC SERPL CALC-MCNC: 31 MG/DL
WBC # SPEC AUTO: 5.04 X10(3)/MCL (ref 4.5–11.5)

## 2024-02-28 PROCEDURE — 82607 VITAMIN B-12: CPT

## 2024-02-28 PROCEDURE — 82306 VITAMIN D 25 HYDROXY: CPT

## 2024-02-28 PROCEDURE — 36415 COLL VENOUS BLD VENIPUNCTURE: CPT

## 2024-02-28 PROCEDURE — 80053 COMPREHEN METABOLIC PANEL: CPT

## 2024-02-28 PROCEDURE — 85027 COMPLETE CBC AUTOMATED: CPT

## 2024-02-28 PROCEDURE — 84443 ASSAY THYROID STIM HORMONE: CPT

## 2024-02-28 PROCEDURE — 83036 HEMOGLOBIN GLYCOSYLATED A1C: CPT

## 2024-02-28 PROCEDURE — 80061 LIPID PANEL: CPT

## 2024-03-29 ENCOUNTER — HOSPITAL ENCOUNTER (EMERGENCY)
Facility: HOSPITAL | Age: 54
Discharge: HOME | End: 2024-03-29
Attending: EMERGENCY MEDICINE
Payer: MEDICAID

## 2024-03-29 VITALS
RESPIRATION RATE: 16 BRPM | SYSTOLIC BLOOD PRESSURE: 112 MMHG | DIASTOLIC BLOOD PRESSURE: 74 MMHG | BODY MASS INDEX: 23.32 KG/M2 | TEMPERATURE: 98 F | HEIGHT: 65 IN | HEART RATE: 66 BPM | WEIGHT: 140 LBS | OXYGEN SATURATION: 98 %

## 2024-03-29 DIAGNOSIS — K08.89 PAIN, DENTAL: Primary | ICD-10-CM

## 2024-03-29 PROCEDURE — 25000003 PHARM REV CODE 250: Performed by: EMERGENCY MEDICINE

## 2024-03-29 PROCEDURE — 99284 EMERGENCY DEPT VISIT MOD MDM: CPT | Mod: 25

## 2024-03-29 PROCEDURE — 96372 THER/PROPH/DIAG INJ SC/IM: CPT | Performed by: EMERGENCY MEDICINE

## 2024-03-29 PROCEDURE — 63600175 PHARM REV CODE 636 W HCPCS: Performed by: EMERGENCY MEDICINE

## 2024-03-29 RX ORDER — HYDROCODONE BITARTRATE AND ACETAMINOPHEN 5; 325 MG/1; MG/1
1 TABLET ORAL EVERY 6 HOURS PRN
Qty: 8 TABLET | Refills: 0 | Status: SHIPPED | OUTPATIENT
Start: 2024-03-29 | End: 2024-05-08

## 2024-03-29 RX ORDER — KETOROLAC TROMETHAMINE 30 MG/ML
30 INJECTION, SOLUTION INTRAMUSCULAR; INTRAVENOUS
Status: COMPLETED | OUTPATIENT
Start: 2024-03-29 | End: 2024-03-29

## 2024-03-29 RX ORDER — AMOXICILLIN AND CLAVULANATE POTASSIUM 875; 125 MG/1; MG/1
1 TABLET, FILM COATED ORAL 2 TIMES DAILY
Qty: 14 TABLET | Refills: 0 | Status: SHIPPED | OUTPATIENT
Start: 2024-03-29 | End: 2024-04-05

## 2024-03-29 RX ORDER — HYDROCODONE BITARTRATE AND ACETAMINOPHEN 10; 325 MG/1; MG/1
1 TABLET ORAL
Status: COMPLETED | OUTPATIENT
Start: 2024-03-29 | End: 2024-03-29

## 2024-03-29 RX ADMIN — KETOROLAC TROMETHAMINE 30 MG: 30 INJECTION, SOLUTION INTRAMUSCULAR at 09:03

## 2024-03-29 RX ADMIN — HYDROCODONE BITARTRATE AND ACETAMINOPHEN 1 TABLET: 10; 325 TABLET ORAL at 09:03

## 2024-03-29 NOTE — ED PROVIDER NOTES
"Encounter Date: 3/29/2024       History     Chief Complaint   Patient presents with    Dental Pain     C/o breaking a tooth yesterday to right lower jaw and it's hurting     The patient is a 53-year-old female with a history of anxiety, depression, hyperlipidemia, who presents emergency department dental pain.  Patient states the [place] right side on her mild broke off during the night.  She states that she had some pain and swelling in the area.  She reports that she called 6 different dentist office this morning but they were all closed likely secondary to the holiday, prompting visit here to the emergency department.  She denies any fever, chills, dysphagia or odynophagia.  Patient is not a smoker.    No  was used.   Dental Pain  The primary symptoms include mouth pain and dental injury. Primary symptoms do not include oral bleeding, oral lesions, headaches, fever, shortness of breath, sore throat or angioedema. The symptoms began yesterday. The symptoms are unchanged. The symptoms are new. The symptoms occur constantly.   The dental injury occurred 12 - 24 hours ago. Affected teeth include: 27/right lower cuspid. The injury is a chip.   Additional symptoms include: dental sensitivity to temperature, gum tenderness and jaw pain. Additional symptoms do not include: gum swelling, purulent gums, trismus, facial swelling, trouble swallowing, pain with swallowing, excessive salivation, dry mouth, taste disturbance, smell disturbance, drooling, ear pain, hearing loss, nosebleeds, swollen glands, goiter and fatigue. Medical issues do not include: smoking.     Review of patient's allergies indicates:   Allergen Reactions    Latex Other (See Comments)     Other reaction(s): Blisters/Breaks down skin  "It eats through my skin"       Past Medical History:   Diagnosis Date    Anxiety disorder, unspecified     Depression      Past Surgical History:   Procedure Laterality Date    APPENDECTOMY      BACK " SURGERY      x2     SECTION, CLASSIC      x4    CHOLECYSTECTOMY      COSMETIC SURGERY      reconstruction    TONSILLECTOMY       No family history on file.  Social History     Tobacco Use    Smoking status: Some Days     Types: Cigarettes    Smokeless tobacco: Never   Substance Use Topics    Alcohol use: Yes     Comment: occassional     Review of Systems   Constitutional:  Negative for fatigue and fever.   HENT:  Negative for drooling, ear pain, facial swelling, hearing loss, nosebleeds, sore throat and trouble swallowing.         Dental pain     Respiratory:  Negative for shortness of breath.    Cardiovascular:  Negative for chest pain.   Gastrointestinal:  Negative for nausea.   Genitourinary:  Negative for dysuria.   Musculoskeletal:  Negative for back pain.   Skin:  Negative for rash.   Neurological:  Negative for weakness and headaches.   Hematological:  Does not bruise/bleed easily.   All other systems reviewed and are negative.      Physical Exam     Initial Vitals [24 0855]   BP Pulse Resp Temp SpO2   112/74 66 16 98.3 °F (36.8 °C) 98 %      MAP       --         Physical Exam    Constitutional: She appears well-developed and well-nourished. She is not diaphoretic. No distress.   HENT:   Head: Normocephalic and atraumatic.   Mouth/Throat: Oropharynx is clear and moist.   Broken tooth number 27, some mild gum erythema noted, no fluctuance, bleeding or drainage   Eyes: Conjunctivae and EOM are normal. No scleral icterus.   Neck: No tracheal deviation present.   Normal range of motion.  Cardiovascular:  Normal rate.           Pulmonary/Chest: No respiratory distress. She has no wheezes.   Abdominal: She exhibits no distension. There is no guarding.   Musculoskeletal:         General: No edema. Normal range of motion.      Cervical back: Normal range of motion.     Neurological: She is alert and oriented to person, place, and time. No cranial nerve deficit. GCS score is 15. GCS eye subscore is 4.  GCS verbal subscore is 5. GCS motor subscore is 6.   Skin: Skin is dry. No pallor.   Psychiatric: She has a normal mood and affect. Her behavior is normal. Judgment and thought content normal.         ED Course   Procedures  Labs Reviewed - No data to display       Imaging Results    None          Medications   ketorolac injection 30 mg (has no administration in time range)   HYDROcodone-acetaminophen  mg per tablet 1 tablet (has no administration in time range)     Medical Decision Making  Risk  Prescription drug management.                                      Clinical Impression:  Final diagnoses:  [K08.89] Pain, dental (Primary)          ED Disposition Condition    Discharge Stable          ED Prescriptions       Medication Sig Dispense Start Date End Date Auth. Provider    amoxicillin-clavulanate 875-125mg (AUGMENTIN) 875-125 mg per tablet Take 1 tablet by mouth 2 (two) times a day. for 7 days 14 tablet 3/29/2024 4/5/2024 Tiffanie Chavez MD    HYDROcodone-acetaminophen (NORCO) 5-325 mg per tablet Take 1 tablet by mouth every 6 (six) hours as needed for Pain. 8 tablet 3/29/2024 -- Tiffanie Chavez MD          Follow-up Information       Follow up With Specialties Details Why Contact Info    Dentist of your choice        Anastacio Boyle FNP  Go in 3 days  [address] [place] [address]  [phone]               Tiffanie Chavez MD  03/29/24 0946

## 2024-04-08 ENCOUNTER — HOSPITAL ENCOUNTER (EMERGENCY)
Facility: HOSPITAL | Age: 54
Discharge: HOME | End: 2024-04-08
Attending: INTERNAL MEDICINE
Payer: MEDICAID

## 2024-04-08 VITALS
SYSTOLIC BLOOD PRESSURE: 161 MMHG | HEIGHT: 65 IN | BODY MASS INDEX: 23.66 KG/M2 | HEART RATE: 60 BPM | RESPIRATION RATE: 19 BRPM | WEIGHT: 142 LBS | OXYGEN SATURATION: 97 % | DIASTOLIC BLOOD PRESSURE: 92 MMHG

## 2024-04-08 DIAGNOSIS — G43.809 OTHER MIGRAINE WITHOUT STATUS MIGRAINOSUS, NOT INTRACTABLE: Primary | ICD-10-CM

## 2024-04-08 LAB
FLUAV AG UPPER RESP QL IA.RAPID: NOT DETECTED
FLUBV AG UPPER RESP QL IA.RAPID: NOT DETECTED
RSV A 5' UTR RNA NPH QL NAA+PROBE: NOT DETECTED
SARS-COV-2 RNA RESP QL NAA+PROBE: NOT DETECTED

## 2024-04-08 PROCEDURE — 0241U COVID/RSV/FLU A&B PCR: CPT | Performed by: PHYSICIAN ASSISTANT

## 2024-04-08 PROCEDURE — 99284 EMERGENCY DEPT VISIT MOD MDM: CPT | Mod: 25

## 2024-04-08 PROCEDURE — 63600175 PHARM REV CODE 636 W HCPCS: Performed by: PHYSICIAN ASSISTANT

## 2024-04-08 PROCEDURE — 96361 HYDRATE IV INFUSION ADD-ON: CPT

## 2024-04-08 PROCEDURE — 96375 TX/PRO/DX INJ NEW DRUG ADDON: CPT

## 2024-04-08 PROCEDURE — 25000003 PHARM REV CODE 250: Performed by: PHYSICIAN ASSISTANT

## 2024-04-08 PROCEDURE — 96374 THER/PROPH/DIAG INJ IV PUSH: CPT

## 2024-04-08 RX ORDER — METHOCARBAMOL 500 MG/1
1000 TABLET, FILM COATED ORAL
Status: COMPLETED | OUTPATIENT
Start: 2024-04-08 | End: 2024-04-08

## 2024-04-08 RX ORDER — PROCHLORPERAZINE EDISYLATE 5 MG/ML
10 INJECTION INTRAMUSCULAR; INTRAVENOUS
Status: COMPLETED | OUTPATIENT
Start: 2024-04-08 | End: 2024-04-08

## 2024-04-08 RX ORDER — MORPHINE SULFATE 4 MG/ML
2 INJECTION, SOLUTION INTRAMUSCULAR; INTRAVENOUS
Status: COMPLETED | OUTPATIENT
Start: 2024-04-08 | End: 2024-04-08

## 2024-04-08 RX ORDER — KETOROLAC TROMETHAMINE 30 MG/ML
30 INJECTION, SOLUTION INTRAMUSCULAR; INTRAVENOUS
Status: COMPLETED | OUTPATIENT
Start: 2024-04-08 | End: 2024-04-08

## 2024-04-08 RX ORDER — DEXAMETHASONE SODIUM PHOSPHATE 4 MG/ML
8 INJECTION, SOLUTION INTRA-ARTICULAR; INTRALESIONAL; INTRAMUSCULAR; INTRAVENOUS; SOFT TISSUE
Status: COMPLETED | OUTPATIENT
Start: 2024-04-08 | End: 2024-04-08

## 2024-04-08 RX ORDER — ONDANSETRON HYDROCHLORIDE 2 MG/ML
4 INJECTION, SOLUTION INTRAVENOUS
Status: COMPLETED | OUTPATIENT
Start: 2024-04-08 | End: 2024-04-08

## 2024-04-08 RX ADMIN — METHOCARBAMOL 1000 MG: 500 TABLET ORAL at 03:04

## 2024-04-08 RX ADMIN — MORPHINE SULFATE 2 MG: 4 INJECTION, SOLUTION INTRAMUSCULAR; INTRAVENOUS at 04:04

## 2024-04-08 RX ADMIN — PROCHLORPERAZINE EDISYLATE 10 MG: 5 INJECTION INTRAMUSCULAR; INTRAVENOUS at 03:04

## 2024-04-08 RX ADMIN — KETOROLAC TROMETHAMINE 30 MG: 30 INJECTION, SOLUTION INTRAMUSCULAR at 03:04

## 2024-04-08 RX ADMIN — ONDANSETRON 4 MG: 2 INJECTION INTRAMUSCULAR; INTRAVENOUS at 04:04

## 2024-04-08 RX ADMIN — DEXAMETHASONE SODIUM PHOSPHATE 8 MG: 4 INJECTION, SOLUTION INTRA-ARTICULAR; INTRALESIONAL; INTRAMUSCULAR; INTRAVENOUS; SOFT TISSUE at 03:04

## 2024-04-08 RX ADMIN — SODIUM CHLORIDE, POTASSIUM CHLORIDE, SODIUM LACTATE AND CALCIUM CHLORIDE 1000 ML: 600; 310; 30; 20 INJECTION, SOLUTION INTRAVENOUS at 03:04

## 2024-04-08 NOTE — DISCHARGE INSTRUCTIONS
Drink plenty of fluids.  Make sure that you are getting sleep.  Take home medications as needed for migraines.  Follow up with PCP in 3-5 days for further evaluation with possible referral to Neurology.

## 2024-04-08 NOTE — ED PROVIDER NOTES
"Encounter Date: 2024       History     Chief Complaint   Patient presents with    Headache     Pt c/o migraine headache onset lastnight, pt has taken "x2 dose of ulbrevy, ibuprofen 800mg x2 doses, zofran, benadryl x2 doses 25mg, and tylenol. " Pt reports no relief.      53-year-old female presents to ED for evaluation of migraine headache since last night.  Patient reports that she has a history of migraines and feels similar in nature.  States she feels a pressure to her forehead.  States she was taken 2 doses of her Ubrelvy, ibuprofen, Zofran, Benadryl at home without relief.  States she often has to come to the ED for IV medications along with IV narcotics.  Patient denies any nausea or vomiting.  Patient states she has a tightness in her neck.  Denies any cough congestion or fever.  States she was feeling chills earlier today.    The history is provided by the patient. No  was used.     Review of patient's allergies indicates:   Allergen Reactions    Latex Other (See Comments)     Other reaction(s): Blisters/Breaks down skin  "It eats through my skin"       Past Medical History:   Diagnosis Date    Anxiety disorder, unspecified     Depression      Past Surgical History:   Procedure Laterality Date    APPENDECTOMY      BACK SURGERY      x2     SECTION, CLASSIC      x4    CHOLECYSTECTOMY      COSMETIC SURGERY      reconstruction    TONSILLECTOMY       No family history on file.  Social History     Tobacco Use    Smoking status: Former     Types: Cigarettes    Smokeless tobacco: Never   Substance Use Topics    Alcohol use: Yes     Comment: occassional    Drug use: Never     Review of Systems   Constitutional:  Negative for fatigue and fever.   HENT:  Negative for congestion, ear pain, rhinorrhea and sore throat.    Respiratory:  Negative for cough, shortness of breath and wheezing.    Cardiovascular:  Negative for chest pain.   Gastrointestinal:  Negative for abdominal pain, nausea " and vomiting.   Musculoskeletal:  Positive for neck pain. Negative for myalgias.   Neurological:  Positive for headaches.   All other systems reviewed and are negative.      Physical Exam     Initial Vitals [04/08/24 1415]   BP Pulse Resp Temp SpO2   121/84 84 18 -- 97 %      MAP       --         Physical Exam    Nursing note and vitals reviewed.  Constitutional: She appears well-developed and well-nourished.   HENT:   Head: Normocephalic and atraumatic.   Right Ear: Tympanic membrane and external ear normal.   Left Ear: Tympanic membrane and external ear normal.   Mouth/Throat: Uvula is midline, oropharynx is clear and moist and mucous membranes are normal. No trismus in the jaw. No uvula swelling. No oropharyngeal exudate, posterior oropharyngeal edema or posterior oropharyngeal erythema.   Eyes: Conjunctivae are normal. Pupils are equal, round, and reactive to light.   Neck: Neck supple.   Normal range of motion.  Cardiovascular:  Normal rate, regular rhythm and normal heart sounds.           Pulmonary/Chest: Breath sounds normal. She has no wheezes. She has no rhonchi. She has no rales.   Abdominal: Abdomen is soft. Bowel sounds are normal. There is no abdominal tenderness. There is no rebound and no guarding.   Musculoskeletal:         General: Normal range of motion.      Cervical back: Normal range of motion and neck supple.     Neurological: She is alert and oriented to person, place, and time. She has normal strength. No cranial nerve deficit or sensory deficit. GCS score is 15. GCS eye subscore is 4. GCS verbal subscore is 5. GCS motor subscore is 6.   Skin: Skin is warm and dry.   Psychiatric: She has a normal mood and affect.         ED Course   Procedures  Labs Reviewed   COVID/RSV/FLU A&B PCR - Normal    Narrative:     The Xpert Xpress SARS-CoV-2/FLU/RSV plus is a rapid, multiplexed real-time PCR test intended for the simultaneous qualitative detection and differentiation of SARS-CoV-2, Influenza A,  Influenza B, and respiratory syncytial virus (RSV) viral RNA in either nasopharyngeal swab or nasal swab specimens.                Imaging Results    None          Medications   lactated ringers bolus 1,000 mL (0 mLs Intravenous Stopped 4/8/24 1621)   dexAMETHasone injection 8 mg (8 mg Intravenous Given 4/8/24 1515)   prochlorperazine injection Soln 10 mg (10 mg Intravenous Given 4/8/24 1518)   methocarbamoL tablet 1,000 mg (1,000 mg Oral Given 4/8/24 1526)   ketorolac injection 30 mg (30 mg Intravenous Given 4/8/24 1517)   morphine injection 2 mg (2 mg Intravenous Given 4/8/24 1626)   ondansetron injection 4 mg (4 mg Intravenous Given 4/8/24 1626)     Medical Decision Making  53-year-old female presents to ED for evaluation of migraine headache since last night.  Patient reports that she has a history of migraines and feels similar in nature.  States she feels a pressure to her forehead.  States she was taken 2 doses of her Ubrelvy, ibuprofen, Zofran, Benadryl at home without relief.  States she often has to come to the ED for IV medications along with IV narcotics.  Patient denies any nausea or vomiting.  Patient states she has a tightness in her neck.  Denies any cough congestion or fever.  States she was feeling chills earlier today.    Differential diagnosis includes but isn't limited to headache, migraine, tension headache, CVA, muscle spasms, viral illness    Amount and/or Complexity of Data Reviewed  Discussion of management or test interpretation with external provider(s): Patient GCS 15 and neuro intact.  Ambulatory with steady gait.  Presents to ED for evaluation of acute exacerbation of her chronic migraines.  Patient has taken multiple home medications.  States she was still having 10/10 pain.  States it feels like her normal headache only worse.  States she feels a tightness in her neck.  Patient given IV fluids along with Toradol, Decadron, and Compazine.  Patient states she was still having a severe  headache.  States that the only thing that helps her headaches is narcotic IV pain medication.  Will give 2mg of morphine. Patient now feeling better.  Patient has multiple home migraine medications at home discussed taking those medications.  Discussed follow up with PCP for possible neurology referral.  Return ED precautions given.  Patient verbalizes understanding    Risk  OTC drugs.  Prescription drug management.  Parenteral controlled substances.                                      Clinical Impression:  Final diagnoses:  [G43.809] Other migraine without status migrainosus, not intractable (Primary)          ED Disposition Condition    Discharge Stable          ED Prescriptions    None       Follow-up Information       Follow up With Specialties Details Why Contact Info    Anastacio Boyle, ELIGIO    [address]  [place] [address]  [phone]               Halina Bowles PA  04/08/24 5170

## 2024-05-07 RX ORDER — TOPIRAMATE 100 MG/1
100 TABLET, FILM COATED ORAL 2 TIMES DAILY
COMMUNITY
Start: 2024-03-11

## 2024-05-07 RX ORDER — HYDROXYZINE PAMOATE 25 MG/1
25 CAPSULE ORAL 3 TIMES DAILY PRN
COMMUNITY
Start: 2024-03-19

## 2024-05-07 RX ORDER — BUTALBITAL, ACETAMINOPHEN AND CAFFEINE 50; 325; 40 MG/1; MG/1; MG/1
1 TABLET ORAL EVERY 6 HOURS PRN
COMMUNITY
Start: 2024-03-20

## 2024-05-07 RX ORDER — LAMOTRIGINE 200 MG/1
200 TABLET ORAL DAILY
COMMUNITY
Start: 2024-03-19

## 2024-05-07 RX ORDER — TRAZODONE HYDROCHLORIDE 100 MG/1
200 TABLET ORAL NIGHTLY PRN
COMMUNITY
Start: 2024-03-19

## 2024-05-07 RX ORDER — ROSUVASTATIN CALCIUM 20 MG/1
20 TABLET, COATED ORAL NIGHTLY
COMMUNITY
Start: 2024-03-06 | End: 2024-06-12 | Stop reason: SDUPTHER

## 2024-05-08 ENCOUNTER — OFFICE VISIT (OUTPATIENT)
Dept: FAMILY MEDICINE | Facility: CLINIC | Age: 54
End: 2024-05-08
Payer: MEDICAID

## 2024-05-08 VITALS
RESPIRATION RATE: 18 BRPM | BODY MASS INDEX: 23.99 KG/M2 | HEIGHT: 65 IN | OXYGEN SATURATION: 98 % | WEIGHT: 144 LBS | DIASTOLIC BLOOD PRESSURE: 62 MMHG | TEMPERATURE: 98 F | HEART RATE: 76 BPM | SYSTOLIC BLOOD PRESSURE: 108 MMHG

## 2024-05-08 DIAGNOSIS — Z12.31 BREAST CANCER SCREENING BY MAMMOGRAM: ICD-10-CM

## 2024-05-08 DIAGNOSIS — K58.0 IRRITABLE BOWEL SYNDROME WITH DIARRHEA: ICD-10-CM

## 2024-05-08 DIAGNOSIS — R74.8 ELEVATED LIVER ENZYMES: ICD-10-CM

## 2024-05-08 DIAGNOSIS — Z80.3 FAMILY HISTORY OF BREAST CANCER IN FIRST DEGREE RELATIVE: ICD-10-CM

## 2024-05-08 DIAGNOSIS — M50.30 DEGENERATIVE DISC DISEASE, CERVICAL: ICD-10-CM

## 2024-05-08 DIAGNOSIS — M51.34 THORACIC DEGENERATIVE DISC DISEASE: ICD-10-CM

## 2024-05-08 DIAGNOSIS — F31.9 BIPOLAR DEPRESSION: Primary | ICD-10-CM

## 2024-05-08 DIAGNOSIS — G43.709 CHRONIC MIGRAINE WITHOUT AURA WITHOUT STATUS MIGRAINOSUS, NOT INTRACTABLE: ICD-10-CM

## 2024-05-08 DIAGNOSIS — G47.00 INSOMNIA, UNSPECIFIED TYPE: ICD-10-CM

## 2024-05-08 DIAGNOSIS — M79.7 FIBROMYALGIA: ICD-10-CM

## 2024-05-08 DIAGNOSIS — K11.7 SIALOSIS: ICD-10-CM

## 2024-05-08 PROCEDURE — 3044F HG A1C LEVEL LT 7.0%: CPT | Mod: CPTII,,, | Performed by: FAMILY MEDICINE

## 2024-05-08 PROCEDURE — 3008F BODY MASS INDEX DOCD: CPT | Mod: CPTII,,, | Performed by: FAMILY MEDICINE

## 2024-05-08 PROCEDURE — 1159F MED LIST DOCD IN RCRD: CPT | Mod: CPTII,,, | Performed by: FAMILY MEDICINE

## 2024-05-08 PROCEDURE — 99204 OFFICE O/P NEW MOD 45 MIN: CPT | Mod: ,,, | Performed by: FAMILY MEDICINE

## 2024-05-08 PROCEDURE — 3078F DIAST BP <80 MM HG: CPT | Mod: CPTII,,, | Performed by: FAMILY MEDICINE

## 2024-05-08 PROCEDURE — 3074F SYST BP LT 130 MM HG: CPT | Mod: CPTII,,, | Performed by: FAMILY MEDICINE

## 2024-05-08 PROCEDURE — 1160F RVW MEDS BY RX/DR IN RCRD: CPT | Mod: CPTII,,, | Performed by: FAMILY MEDICINE

## 2024-05-08 RX ORDER — ARIPIPRAZOLE 10 MG/1
10 TABLET ORAL DAILY
COMMUNITY
Start: 2024-04-15

## 2024-05-08 RX ORDER — FLUOXETINE HYDROCHLORIDE 40 MG/1
40 CAPSULE ORAL DAILY
COMMUNITY
Start: 2024-04-16 | End: 2024-06-05

## 2024-05-08 RX ORDER — ONDANSETRON 4 MG/1
4 TABLET, ORALLY DISINTEGRATING ORAL EVERY 4 HOURS PRN
COMMUNITY

## 2024-05-08 RX ORDER — PREGABALIN 75 MG/1
75 CAPSULE ORAL 2 TIMES DAILY
Qty: 60 CAPSULE | Refills: 5 | Status: SHIPPED | OUTPATIENT
Start: 2024-05-08 | End: 2024-11-04

## 2024-05-08 RX ORDER — GABAPENTIN 300 MG/1
300 CAPSULE ORAL 2 TIMES DAILY
COMMUNITY
Start: 2024-02-09 | End: 2024-05-08

## 2024-05-08 NOTE — LETTER
AUTHORIZATION FOR RELEASE OF   CONFIDENTIAL INFORMATION    Dear Dr Santos,    We are seeing Rosemarie Pretty, date of birth 1970, in the clinic at [facility]. Jose Elias Molina DO is the patient's PCP. Rosemarie Pretty has an outstanding lab/procedure at the time we reviewed her chart. In order to help keep her health information updated, she has authorized us to request the following medical record(s):        (  )  MAMMOGRAM                                      (X)  COLONOSCOPY REPORT & PATH      (  )  PAP SMEAR                                          (X)  EGD REPORT & PATH     (  )  DEXA SCAN                                          (  )  EYE EXAM            (  )  FOOT EXAM                                          (  )  ENTIRE RECORD     (  )  OUTSIDE IMMUNIZATIONS                 (X) LAST OFFICE NOTE         Please fax records to [facility], [place], Quinn, DO, [phone].              Patient Name: Rosemarie Pretty  : 1970  Patient Phone #: [phone]

## 2024-05-08 NOTE — PROGRESS NOTES
Patient ID: [id number]     Chief Complaint: Establish Care (Previous PCP Anastacio Boyle NP. Due for mmg and last labs 24 at [facility] in [place].), Migraine (Chronic, recurrent migraines that have been worsening and becoming more frequent despite current regimen of topamax, imitrex, fioricet and zofran for nausea. Now having migraines 3x per week now, medications to help take edge off but migraines still persist.), Anxiety, and Medication Refill    HPI:     Rosemarie Pretty is a 53 y.o. female here today for Establish Care (Previous PCP Anastacio Boyle NP. Due for mmg and last labs 24 at [facility] in [place].), Migraine (Chronic, recurrent migraines that have been worsening and becoming more frequent despite current regimen of topamax, imitrex, fioricet and zofran for nausea. Now having migraines 3x per week now, medications to help take edge off but migraines still persist.), Anxiety, and Medication Refill.       -------------------------------------    Anxiety disorder, unspecified    Bipolar depression    C. difficile colitis    Degenerative disc disease, cervical    Depression    Elevated LFTs    Family history of breast cancer in first degree relative    heavy maternal side history    Fibromyalgia    GERD (gastroesophageal reflux disease)    History of necrotizing fasciitis    Hydronephrosis    IBS (irritable bowel syndrome)    Insomnia    Migraines    Renal cyst    Sigmoid diverticulosis    Thoracic degenerative disc disease        Past Surgical History:   Procedure Laterality Date    ABDOMINOPLASTY  2003    Reconstruction from Necrotizing Fascitis    APPENDECTOMY  2010    CERVICAL SPINE SURGERY       SECTION       SECTION       SECTION       SECTION      CHOLECYSTECTOMY      COLONOSCOPY      Dr Chio Santos    CYSTOSCOPY W/ URETERAL STENT PLACEMENT Right 2017    Dr Arpit Duarte    CYSTOSCOPY W/ URETERAL STENT PLACEMENT   "06/16/2017    Dr Arpit Duarte    DEBRIDEMENT, ABDOMINAL WALL, FOR NECROTIZING SOFT TISSUE INFECTION  2002    REFRACTIVE SURGERY  1990    ROBOT-ASSISTED LAPAROSCOPIC PYELOPLASTY Right 06/29/2017    Dr Davonte Cohen    THORACIC SPINE SURGERY  2006    TONSILLECTOMY  1971    TOTAL ABDOMINAL HYSTERECTOMY W/ BILATERAL SALPINGOOPHORECTOMY  2002       Review of patient's allergies indicates:   Allergen Reactions    Latex Other (See Comments)     Other reaction(s): Blisters/Breaks down skin  "It eats through my skin"         Outpatient Medications Marked as Taking for the 5/8/24 encounter (Office Visit) with Jose Elias Molina, DO   Medication Sig Dispense Refill    ARIPiprazole (ABILIFY) 10 MG Tab Take 10 mg by mouth once daily.      butalbital-acetaminophen-caffeine -40 mg (FIORICET, ESGIC) -40 mg per tablet Take 1 tablet by mouth every 6 (six) hours as needed for Headaches.      FLUoxetine 40 MG capsule Take 40 mg by mouth once daily.      hydrOXYzine pamoate (VISTARIL) 25 MG Cap Take 25 mg by mouth 3 (three) times daily as needed.      lamoTRIgine (LAMICTAL) 200 MG tablet Take 200 mg by mouth once daily.      ondansetron (ZOFRAN-ODT) 4 MG TbDL Take 4 mg by mouth every 4 (four) hours as needed (Nausea/Vomiting).      rosuvastatin (CRESTOR) 20 MG tablet Take 20 mg by mouth every evening.      sumatriptan (IMITREX) 25 MG Tab Take 2 tablets (50 mg total) by mouth 2 (two) times a day. Take 1 dose at onset of headache.  May repeat x1 dose in 2 hours if headache not relieved. for 12 days 48 tablet 0    topiramate (TOPAMAX) 100 MG tablet Take 100 mg by mouth 2 (two) times daily.      traZODone (DESYREL) 100 MG tablet Take 200 mg by mouth nightly as needed.      [DISCONTINUED] gabapentin (NEURONTIN) 300 MG capsule Take 300 mg by mouth 2 (two) times daily.         Social History     Socioeconomic History    Marital status:    Tobacco Use    Smoking status: Former     Current packs/day: 0.00     Average " packs/day: 0.5 packs/day for 8.0 years (4.0 ttl pk-yrs)     Types: Cigarettes     Start date:      Quit date:      Years since quittin.3    Smokeless tobacco: Never   Substance and Sexual Activity    Alcohol use: Yes     Comment: occassional    Drug use: Never    Sexual activity: Yes     Birth control/protection: See Surgical Hx     Social Determinants of Health     Financial Resource Strain: High Risk (2024)    Overall Financial Resource Strain (CARDIA)     Difficulty of Paying Living Expenses: Hard   Food Insecurity: Food Insecurity Present (2024)    Hunger Vital Sign     Worried About Running Out of Food in the Last Year: Often true     Ran Out of Food in the Last Year: Sometimes true   Transportation Needs: No Transportation Needs (2024)    PRAPARE - Transportation     Lack of Transportation (Medical): No     Lack of Transportation (Non-Medical): No   Physical Activity: Sufficiently Active (2024)    Exercise Vital Sign     Days of Exercise per Week: 5 days     Minutes of Exercise per Session: 30 min   Stress: No Stress Concern Present (2024)    [employer] - Occupational Stress Questionnaire     Feeling of Stress : Only a little   Housing Stability: Low Risk  (2024)    Housing Stability Vital Sign     Unable to Pay for Housing in the Last Year: No     Homeless in the Last Year: No        Family History   Problem Relation Name Age of Onset    Alcohol abuse Mother      Parkinsonism Mother      Psychosis Mother      Hypertension Mother      Anxiety disorder Mother      Hyperlipidemia Mother      Leukemia Mother  67        Cause of death    Lymphoma Mother  67        Cause of death    Breast cancer Mother      Heart failure Mother      Alcohol abuse Father      Mitral valve prolapse Father      Anxiety disorder Father      Psychosis Father      Mitral valve prolapse Sister      Alcohol abuse Sister      Breast cancer Maternal Grandmother      Breast  "cancer Maternal Aunt      Breast cancer Maternal Aunt      Breast cancer Maternal Aunt          Patient Care Team:  Jose Elias Molina DO as PCP - General (Family Medicine)  Anastacio Boyle FNP Henry, Barry J, MD (Orthopedic Surgery)  Chio Santos III, MD as Consulting Physician (Gastroenterology)  [facility] ([facility])     Subjective:     Review of Systems   Constitutional:  Positive for malaise/fatigue. Negative for chills and fever.   Respiratory:  Negative for shortness of breath.    Cardiovascular:  Negative for chest pain.   Gastrointestinal:  Positive for diarrhea. Negative for constipation.   Musculoskeletal:  Positive for back pain and neck pain.   Neurological:  Positive for headaches.   Psychiatric/Behavioral:  Positive for depression. The patient is nervous/anxious and has insomnia.        See HPI for details  All Other ROS: Negative except as stated in HPI.       Objective:     /62   Pulse 76   Temp 97.5 °F (36.4 °C) (Temporal)   Resp 18   Ht 5' 5.35" (1.66 m)   Wt 65.3 kg (144 lb)   SpO2 98%   BMI 23.70 kg/m²     Physical Exam  Vitals reviewed.   Constitutional:       General: She is not in acute distress.     Appearance: Normal appearance.   Cardiovascular:      Rate and Rhythm: Normal rate and regular rhythm.      Heart sounds: No murmur heard.     No friction rub. No gallop.   Pulmonary:      Effort: No respiratory distress.      Breath sounds: No wheezing, rhonchi or rales.   Musculoskeletal:         General: Tenderness present. No swelling or deformity.      Right lower leg: No edema.      Left lower leg: No edema.      Comments: Tenderness to palpation of cervical and thoracic spine.    Skin:     General: Skin is warm and dry.      Findings: No lesion or rash.   Neurological:      General: No focal deficit present.      Mental Status: She is alert.   Psychiatric:         Mood and Affect: Mood normal.         Assessment/Plan:     1. " Bipolar depression  -Currently on abilify 10mg daily and fluoxetine 40mg daily. Advised patient to create a list of medications she has been on previously and the reasons she was taken off of them if possible. Potentially a good candidate for effexor or cymbalta to help with chronic pain/fibromyalgia/chronic headaches.   2. Degenerative disc disease, cervical  -     pregabalin (LYRICA) 75 MG capsule; Take 1 capsule (75 mg total) by mouth 2 (two) times daily.  Dispense: 60 capsule; Refill: 5  Will try switching gabapentin to lyrica.   3. Family history of breast cancer in first degree relative  Overview:  heavy maternal side history  Orders:  -     Mammo Digital Screening Bilat w/ Gerald; Future; Expected date: 05/08/2024    4. Fibromyalgia  -     pregabalin (LYRICA) 75 MG capsule; Take 1 capsule (75 mg total) by mouth 2 (two) times daily.  Dispense: 60 capsule; Refill: 5  -see above  5. Irritable bowel syndrome with diarrhea  -possibly exacerbated by chronic use of NSAIDS. Advised patient to try tylenol arthritis 650mg TID-QID pending review of liver function given her history of elevated liver enzymes recently.   6. Insomnia, unspecified type  Currently on trazodone 200mg daily but not sleeping well. Will try to improve pain symptoms to see if this can help with insomnia.   7. Thoracic degenerative disc disease  Patient does have sciolosis and a shorter left leg) advised patient to get shoe lift/inserts to possibly help with chronic back and neck pain.   8. Chronic migraine without aura without status migrainosus, not intractable  -suspect tension type headache rather than migraine headache. Possibly a candidate for cymbalta or effexor. Will try treating chronic cervical pain (see above plan).   9. Breast cancer screening by mammogram  -     Mammo Digital Screening Bilat w/ Gerald; Future; Expected date: 05/08/2024    10. Elevated liver enzymes  -     CBC Auto Differential; Future; Expected date: 05/08/2024  -      Comprehensive Metabolic Panel; Future; Expected date: 05/08/2024    11. Sialosis  See thoracic degenerative disc disease        Follow up:     Follow up in about 4 weeks (around 6/5/2024) for Follow up with Dr. PEDERSON for chronic neck pain, headaches, and anxiety/depression. In addition to their scheduled follow up, the patient has also been instructed to follow up on as needed basis.

## 2024-05-10 ENCOUNTER — TELEPHONE (OUTPATIENT)
Dept: FAMILY MEDICINE | Facility: CLINIC | Age: 54
End: 2024-05-10
Payer: MEDICAID

## 2024-05-10 ENCOUNTER — LAB VISIT (OUTPATIENT)
Dept: LAB | Facility: HOSPITAL | Age: 54
End: 2024-05-10
Attending: FAMILY MEDICINE
Payer: MEDICAID

## 2024-05-10 DIAGNOSIS — R74.8 ELEVATED LIVER ENZYMES: ICD-10-CM

## 2024-05-10 DIAGNOSIS — E86.0 DEHYDRATION: Primary | ICD-10-CM

## 2024-05-10 LAB
ALBUMIN SERPL-MCNC: 3.9 G/DL (ref 3.5–5)
ALBUMIN/GLOB SERPL: 1.2 RATIO (ref 1.1–2)
ALP SERPL-CCNC: 100 UNIT/L (ref 40–150)
ALT SERPL-CCNC: 46 UNIT/L (ref 0–55)
AST SERPL-CCNC: 39 UNIT/L (ref 5–34)
BASOPHILS # BLD AUTO: 0.06 X10(3)/MCL
BASOPHILS NFR BLD AUTO: 0.9 %
BILIRUB SERPL-MCNC: 0.4 MG/DL
BUN SERPL-MCNC: 15 MG/DL (ref 9.8–20.1)
CALCIUM SERPL-MCNC: 8.8 MG/DL (ref 8.4–10.2)
CHLORIDE SERPL-SCNC: 115 MMOL/L (ref 98–107)
CO2 SERPL-SCNC: 22 MMOL/L (ref 22–29)
CREAT SERPL-MCNC: 1.13 MG/DL (ref 0.55–1.02)
EOSINOPHIL # BLD AUTO: 0.15 X10(3)/MCL (ref 0–0.9)
EOSINOPHIL NFR BLD AUTO: 2.2 %
ERYTHROCYTE [DISTWIDTH] IN BLOOD BY AUTOMATED COUNT: 12.1 % (ref 11.5–17)
GFR SERPLBLD CREATININE-BSD FMLA CKD-EPI: 58 ML/MIN/1.73/M2
GLOBULIN SER-MCNC: 3.3 GM/DL (ref 2.4–3.5)
GLUCOSE SERPL-MCNC: 95 MG/DL (ref 74–100)
HCT VFR BLD AUTO: 38.4 % (ref 37–47)
HGB BLD-MCNC: 12.2 G/DL (ref 12–16)
IMM GRANULOCYTES # BLD AUTO: 0.04 X10(3)/MCL (ref 0–0.04)
IMM GRANULOCYTES NFR BLD AUTO: 0.6 %
LYMPHOCYTES # BLD AUTO: 2.65 X10(3)/MCL (ref 0.6–4.6)
LYMPHOCYTES NFR BLD AUTO: 39.6 %
MCH RBC QN AUTO: 30.7 PG (ref 27–31)
MCHC RBC AUTO-ENTMCNC: 31.8 G/DL (ref 33–36)
MCV RBC AUTO: 96.5 FL (ref 80–94)
MONOCYTES # BLD AUTO: 0.49 X10(3)/MCL (ref 0.1–1.3)
MONOCYTES NFR BLD AUTO: 7.3 %
NEUTROPHILS # BLD AUTO: 3.3 X10(3)/MCL (ref 2.1–9.2)
NEUTROPHILS NFR BLD AUTO: 49.4 %
PLATELET # BLD AUTO: 263 X10(3)/MCL (ref 130–400)
PMV BLD AUTO: 9.1 FL (ref 7.4–10.4)
POTASSIUM SERPL-SCNC: 4 MMOL/L (ref 3.5–5.1)
PROT SERPL-MCNC: 7.2 GM/DL (ref 6.4–8.3)
RBC # BLD AUTO: 3.98 X10(6)/MCL (ref 4.2–5.4)
SODIUM SERPL-SCNC: 142 MMOL/L (ref 136–145)
WBC # SPEC AUTO: 6.69 X10(3)/MCL (ref 4.5–11.5)

## 2024-05-10 PROCEDURE — 36415 COLL VENOUS BLD VENIPUNCTURE: CPT

## 2024-05-10 PROCEDURE — 80053 COMPREHEN METABOLIC PANEL: CPT

## 2024-05-10 PROCEDURE — 85025 COMPLETE CBC W/AUTO DIFF WBC: CPT

## 2024-05-10 NOTE — TELEPHONE ENCOUNTER
----- Message from Selena Duarte sent at 5/10/2024 11:02 AM CDT -----  Patient received results of blood test she did yesterday, got an alert through the portal that she needed to discuss results with doc, can we have doc look at results and callthe patient back with his response.

## 2024-06-05 ENCOUNTER — DOCUMENTATION ONLY (OUTPATIENT)
Dept: FAMILY MEDICINE | Facility: CLINIC | Age: 54
End: 2024-06-05

## 2024-06-05 ENCOUNTER — HOSPITAL ENCOUNTER (OUTPATIENT)
Dept: RADIOLOGY | Facility: HOSPITAL | Age: 54
Discharge: HOME | End: 2024-06-05
Payer: MEDICAID

## 2024-06-05 ENCOUNTER — OFFICE VISIT (OUTPATIENT)
Dept: FAMILY MEDICINE | Facility: CLINIC | Age: 54
End: 2024-06-05
Payer: MEDICAID

## 2024-06-05 VITALS
BODY MASS INDEX: 23.82 KG/M2 | DIASTOLIC BLOOD PRESSURE: 73 MMHG | OXYGEN SATURATION: 98 % | SYSTOLIC BLOOD PRESSURE: 114 MMHG | WEIGHT: 143 LBS | RESPIRATION RATE: 16 BRPM | TEMPERATURE: 101 F | HEART RATE: 100 BPM | HEIGHT: 65 IN

## 2024-06-05 DIAGNOSIS — R06.02 SHORTNESS OF BREATH: ICD-10-CM

## 2024-06-05 DIAGNOSIS — R50.9 FEVER, UNSPECIFIED FEVER CAUSE: ICD-10-CM

## 2024-06-05 DIAGNOSIS — N18.31 STAGE 3A CHRONIC KIDNEY DISEASE: ICD-10-CM

## 2024-06-05 DIAGNOSIS — R07.89 CHEST TIGHTNESS: ICD-10-CM

## 2024-06-05 DIAGNOSIS — J40 BRONCHITIS: Primary | ICD-10-CM

## 2024-06-05 PROCEDURE — 3074F SYST BP LT 130 MM HG: CPT | Mod: CPTII,,,

## 2024-06-05 PROCEDURE — 71046 X-RAY EXAM CHEST 2 VIEWS: CPT | Mod: TC

## 2024-06-05 PROCEDURE — 3078F DIAST BP <80 MM HG: CPT | Mod: CPTII,,,

## 2024-06-05 PROCEDURE — 3044F HG A1C LEVEL LT 7.0%: CPT | Mod: CPTII,,,

## 2024-06-05 PROCEDURE — 99214 OFFICE O/P EST MOD 30 MIN: CPT | Mod: ,,,

## 2024-06-05 PROCEDURE — 1159F MED LIST DOCD IN RCRD: CPT | Mod: CPTII,,,

## 2024-06-05 PROCEDURE — 1160F RVW MEDS BY RX/DR IN RCRD: CPT | Mod: CPTII,,,

## 2024-06-05 PROCEDURE — 3008F BODY MASS INDEX DOCD: CPT | Mod: CPTII,,,

## 2024-06-05 RX ORDER — DOXYCYCLINE 100 MG/1
100 CAPSULE ORAL 2 TIMES DAILY
COMMUNITY
Start: 2024-06-03

## 2024-06-05 RX ORDER — AMOXICILLIN AND CLAVULANATE POTASSIUM 500; 125 MG/1; MG/1
1 TABLET, FILM COATED ORAL 2 TIMES DAILY
Qty: 10 TABLET | Refills: 0 | Status: SHIPPED | OUTPATIENT
Start: 2024-06-05 | End: 2024-06-10

## 2024-06-05 RX ORDER — AMOXICILLIN AND CLAVULANATE POTASSIUM 500; 125 MG/1; MG/1
1 TABLET, FILM COATED ORAL EVERY 8 HOURS
Qty: 15 TABLET | Refills: 0 | Status: CANCELLED | OUTPATIENT
Start: 2024-06-05 | End: 2024-06-10

## 2024-06-05 RX ORDER — ALBUTEROL SULFATE 90 UG/1
1 AEROSOL, METERED RESPIRATORY (INHALATION) EVERY 4 HOURS PRN
COMMUNITY
Start: 2024-06-03

## 2024-06-05 RX ORDER — ALBUTEROL SULFATE 0.83 MG/ML
2.5 SOLUTION RESPIRATORY (INHALATION) EVERY 4 HOURS PRN
COMMUNITY
Start: 2024-06-03

## 2024-06-05 RX ORDER — CODEINE PHOSPHATE AND GUAIFENESIN 10; 100 MG/5ML; MG/5ML
5 SOLUTION ORAL EVERY 8 HOURS PRN
Qty: 105 ML | Refills: 0 | Status: SHIPPED | OUTPATIENT
Start: 2024-06-05 | End: 2024-06-12

## 2024-06-05 RX ORDER — DULOXETIN HYDROCHLORIDE 60 MG/1
60 CAPSULE, DELAYED RELEASE ORAL DAILY
COMMUNITY
Start: 2024-05-20

## 2024-06-05 RX ORDER — CODEINE PHOSPHATE AND GUAIFENESIN 10; 100 MG/5ML; MG/5ML
5 SOLUTION ORAL EVERY 6 HOURS PRN
Qty: 140 ML | Refills: 0 | Status: CANCELLED | OUTPATIENT
Start: 2024-06-05 | End: 2024-06-12

## 2024-06-05 NOTE — PROGRESS NOTES
Patient ID: [id number]     Chief Complaint: Bronchitis (Went to [facility] 6/3/24 for cough, sinus, congestion, fever. Was not swabbed for anything and was diagnosed with bronchitis. Gave neb tx, alb inh and doxy. Not feeling any better, concerned about pneumonia. Fever went up to 101-102, took tylenol this morning and temp went down to 100. Cannot stop coughing and hasn't been able to get comfortable enough to sleep x2 days.)      HPI:     Rosemarie Pretty, a 53-year-old woman, is present today for an urgent care follow-up. On 6/3/24, she sought medical attention due to a cough, sinus congestion, and fevers reaching up to 102 degrees Fahrenheit. No viral illness tests or imaging were conducted at that time. Nebulizer treatments and Doxycycline were prescribed. Regrettably, she hasn't experienced any improvement and worries that she might have pneumonia. The persistent coughing has hindered her ability to sleep.    Past Medical History:   Diagnosis Date    Abdominal hernia with obstruction and without gangrene     Anxiety disorder, unspecified     Bipolar depression     C. difficile colitis     Chronic anxiety     Degenerative disc disease, cervical     Depression     Elevated LFTs 02/28/2024    Family history of breast cancer in first degree relative     heavy maternal side history    Family history of systemic lupus erythematosus     Fibromyalgia     GERD (gastroesophageal reflux disease)     History of necrotizing fasciitis     HLD (hyperlipidemia)     Hydronephrosis     IBS (irritable bowel syndrome)     Insomnia     Migraines     Mild depression     Onychomycosis     Recurrent sinusitis     Renal cyst     Sigmoid diverticulosis     Tension-type headache     Thoracic degenerative disc disease     Traumatic injury of back         Past Surgical History:   Procedure Laterality Date    ABDOMINOPLASTY  2003    Reconstruction from Necrotizing Fascitis    APPENDECTOMY  2010    CERVICAL SPINE SURGERY  2004      SECTION       SECTION  2000     SECTION       SECTION  1989    CHOLECYSTECTOMY  2010    COLONOSCOPY      Dr Chio Santos    CYSTOSCOPY W/ URETERAL STENT PLACEMENT Right 2017    Dr Arpit Duarte    CYSTOSCOPY W/ URETERAL STENT PLACEMENT  2017    Dr Arpit Duarte    DEBRIDEMENT, ABDOMINAL WALL, FOR NECROTIZING SOFT TISSUE INFECTION      REFRACTIVE SURGERY  1990    ROBOT-ASSISTED LAPAROSCOPIC PYELOPLASTY Right 2017    Dr Davonte Cohen    THORACIC SPINE SURGERY  2006    TONSILLECTOMY  1971    TOTAL ABDOMINAL HYSTERECTOMY W/ BILATERAL SALPINGOOPHORECTOMY  2002        Social History     Socioeconomic History    Marital status:    Tobacco Use    Smoking status: Former     Current packs/day: 0.00     Average packs/day: 0.5 packs/day for 8.0 years (4.0 ttl pk-yrs)     Types: Cigarettes     Start date:      Quit date:      Years since quittin.4    Smokeless tobacco: Never   Substance and Sexual Activity    Alcohol use: Yes     Comment: occassional    Drug use: Never    Sexual activity: Yes     Birth control/protection: See Surgical Hx   Social History Narrative    ** Merged History Encounter **          Social Determinants of Health     Financial Resource Strain: High Risk (2024)    Overall Financial Resource Strain (CARDIA)     Difficulty of Paying Living Expenses: Hard   Food Insecurity: Food Insecurity Present (2024)    Hunger Vital Sign     Worried About Running Out of Food in the Last Year: Often true     Ran Out of Food in the Last Year: Sometimes true   Transportation Needs: No Transportation Needs (2024)    PRAPARE - Transportation     Lack of Transportation (Medical): No     Lack of Transportation (Non-Medical): No   Physical Activity: Sufficiently Active (2024)    Exercise Vital Sign     Days of Exercise per Week: 5 days     Minutes of Exercise per Session: 30 min   Stress: No Stress Concern Present (2024)     "[facility] - Occupational Stress Questionnaire     Feeling of Stress : Only a little   Housing Stability: Low Risk  (5/8/2024)    Housing Stability Vital Sign     Unable to Pay for Housing in the Last Year: No     Homeless in the Last Year: No        Current Outpatient Medications   Medication Instructions    albuterol (PROVENTIL) 2.5 mg, Nebulization, Every 4 hours PRN    amoxicillin-clavulanate 500-125mg (AUGMENTIN) 500-125 mg Tab 500 mg, Oral, 2 times daily    ARIPiprazole (ABILIFY) 10 mg, Oral, Daily    butalbital-acetaminophen-caffeine -40 mg (FIORICET, ESGIC) -40 mg per tablet 1 tablet, Every 6 hours PRN    doxycycline (VIBRAMYCIN) 100 mg, Oral, 2 times daily    DULoxetine (CYMBALTA) 60 mg, Oral, Daily    guaiFENesin-codeine 100-10 mg/5 ml (TUSSI-ORGANIDIN NR)  mg/5 mL syrup 5 mLs, Oral, Every 8 hours PRN    hydrOXYzine pamoate (VISTARIL) 25 mg, Oral, 3 times daily PRN    lamoTRIgine (LAMICTAL) 200 mg, Oral, Daily    ondansetron (ZOFRAN-ODT) 4 mg, Oral, Every 4 hours PRN    pregabalin (LYRICA) 75 mg, Oral, 2 times daily    rosuvastatin (CRESTOR) 20 mg, Oral, Nightly    sumatriptan (IMITREX) 50 mg, Oral, 2 times daily, Take 1 dose at onset of headache.  May repeat x1 dose in 2 hours if headache not relieved.    topiramate (TOPAMAX) 100 mg, Oral, 2 times daily    traZODone (DESYREL) 200 mg, Oral, Nightly PRN    VENTOLIN HFA 90 mcg/actuation inhaler 1 puff, Inhalation, Every 4 hours PRN       Review of patient's allergies indicates:   Allergen Reactions    Latex Other (See Comments)     Other reaction(s): Blisters/Breaks down skin  "It eats through my skin"          Patient Care Team:  Jose Elias Molina DO as PCP - General (Family Medicine)  Tyrone Velazco MD (Orthopedic Surgery)  Chio Santos III, MD as Consulting Physician (Gastroenterology)  [place] - (Psychiatry)     Subjective:     Review of Systems    12 point review of " "systems conducted, negative except as stated in the history of present illness. See HPI for details.    Objective:     Visit Vitals  /73   Pulse 100   Temp (!) 100.6 °F (38.1 °C) (Oral)   Resp 16   Ht 5' 5.35" (1.66 m)   Wt 64.9 kg (143 lb)   SpO2 98%   BMI 23.54 kg/m²     Physical Exam  Vitals and nursing note reviewed.   Constitutional:       General: She is not in acute distress.     Appearance: She is not ill-appearing.   HENT:      Head: Normocephalic and atraumatic.      Nose: Congestion and rhinorrhea present. Rhinorrhea is purulent.      Right Turbinates: Enlarged and swollen.      Left Turbinates: Enlarged and swollen.      Right Sinus: Frontal sinus tenderness present.      Left Sinus: Frontal sinus tenderness present.      Mouth/Throat:      Mouth: Mucous membranes are moist.      Pharynx: Oropharynx is clear.   Eyes:      General: No scleral icterus.     Extraocular Movements: Extraocular movements intact.      Conjunctiva/sclera: Conjunctivae normal.      Pupils: Pupils are equal, round, and reactive to light.   Neck:      Vascular: No carotid bruit.   Cardiovascular:      Rate and Rhythm: Normal rate and regular rhythm.      Heart sounds: No murmur heard.     No friction rub. No gallop.   Pulmonary:      Effort: Pulmonary effort is normal. No respiratory distress.      Breath sounds: Examination of the right-upper field reveals rhonchi. Examination of the left-upper field reveals rhonchi. Examination of the right-middle field reveals rhonchi. Examination of the left-middle field reveals rhonchi. Examination of the right-lower field reveals rhonchi. Examination of the left-lower field reveals rhonchi. Rhonchi present. No wheezing or rales.   Abdominal:      General: Abdomen is flat. Bowel sounds are normal. There is no distension.      Palpations: Abdomen is soft. There is no mass.      Tenderness: There is no abdominal tenderness.   Musculoskeletal:         General: Normal range of motion.      " Cervical back: Normal range of motion and neck supple.   Skin:     General: Skin is warm and dry.   Neurological:      General: No focal deficit present.      Mental Status: She is alert.   Psychiatric:         Mood and Affect: Mood normal.       Labs Reviewed:     Chemistry:  Lab Results   Component Value Date     05/10/2024    K 4.0 05/10/2024    BUN 15.0 05/10/2024    CREATININE 1.13 (H) 05/10/2024    EGFRNORACEVR 58 05/10/2024    GLUCOSE 95 05/10/2024    CALCIUM 8.8 05/10/2024    ALKPHOS 100 05/10/2024    LABPROT 7.2 05/10/2024    ALBUMIN 3.9 05/10/2024    BILIDIR 0.9 03/04/2022    IBILI 0.40 03/04/2022    AST 39 (H) 05/10/2024    ALT 46 05/10/2024    MG 2.00 10/01/2021    PHOS 1.9 (L) 10/01/2021    [id number] 30.9 02/28/2024    TSH 1.384 02/28/2024        Lab Results   Component Value Date    HGBA1C 5.0 02/28/2024        Hematology:  Lab Results   Component Value Date    WBC 6.69 05/10/2024    HGB 12.2 05/10/2024    HCT 38.4 05/10/2024     05/10/2024       Lipid Panel:  Lab Results   Component Value Date    CHOL 150 02/28/2024    HDL 54 02/28/2024    LDL 65.00 02/28/2024    TRIG 157 (H) 02/28/2024    TOTALCHOLEST 3 02/28/2024        Urine:  Lab Results   Component Value Date    APPEARANCEUA Clear 08/05/2023    SGUA 1.010 08/05/2023    PROTEINUA Negative 08/05/2023    KETONESUA Negative 08/05/2023    LEUKOCYTESUR Negative 08/05/2023    RBCUA 0-5 08/05/2023    WBCUA None Seen 08/05/2023    BACTERIA None Seen 08/05/2023        Assessment:       ICD-10-CM ICD-9-CM   1. Bronchitis  J40 490   2. Fever, unspecified fever cause  R50.9 780.60   3. Stage 3a chronic kidney disease  N18.31 585.3      Plan:     1. Bronchitis  -     X-Ray Chest PA And Lateral; Future; Expected date: 06/05/2024  -     amoxicillin-clavulanate 500-125mg (AUGMENTIN) 500-125 mg Tab; Take 1 tablet (500 mg total) by mouth 2 (two) times daily. for 5 days  Dispense: 10 tablet; Refill: 0  -     guaiFENesin-codeine 100-10 mg/5 ml  (TUSSI-ORGANIDIN NR)  mg/5 mL syrup; Take 5 mLs by mouth every 8 (eight) hours as needed for Cough or Congestion.  Dispense: 105 mL; Refill: 0    2. Fever, unspecified fever cause  -     COVID/FLU A&B PCR; Future; Expected date: 06/05/2024    3. Stage 3a chronic kidney disease  Assessment & Plan:  Lab Results   Component Value Date    EGFRNORACEVR 58 05/10/2024    EGFRNORACEVR 52 02/28/2024    EGFRNORACEVR 51 (A) 09/18/2023     Stable from renal standpoint.  Follow renoprotective measures including Renal Diet (reduce intake of nuts, peanut butter, milk, cheese, dried beans, peas) and Low Sodium Diet (less than 2 grams per day).  Avoid NSAIDs (Aleve, Mobic, Celebrex, Ibuprofen, Advil, Toradol and Diclofenac). May take Tylenol as needed for headache/pain.  Control DM with goal A1C <7. BP goal <130/80. LDL goal < 100.  Stay well hydrated. Avoid alcohol and soda. Limit tea and coffee.      Other orders  -     Cancel: Throat Screen, Rapid Strep; Future; Expected date: 06/05/2024      Follow up if symptoms worsen or fail to improve. In addition to their scheduled follow up, the patient has also been instructed to follow up on as needed basis.     Future Appointments   Date Time Provider Department Center   7/1/2024  7:30 AM [facility] SIXTO [facility] BUBBA Shafer NP

## 2024-06-05 NOTE — ASSESSMENT & PLAN NOTE
Lab Results   Component Value Date    EGFRNORACEVR 58 05/10/2024    EGFRNORACEVR 52 02/28/2024    EGFRNORACEVR 51 (A) 09/18/2023     Stable from renal standpoint.  Follow renoprotective measures including Renal Diet (reduce intake of nuts, peanut butter, milk, cheese, dried beans, peas) and Low Sodium Diet (less than 2 grams per day).  Avoid NSAIDs (Aleve, Mobic, Celebrex, Ibuprofen, Advil, Toradol and Diclofenac). May take Tylenol as needed for headache/pain.  Control DM with goal A1C <7. BP goal <130/80. LDL goal < 100.  Stay well hydrated. Avoid alcohol and soda. Limit tea and coffee.

## 2024-06-12 ENCOUNTER — TELEPHONE (OUTPATIENT)
Dept: FAMILY MEDICINE | Facility: CLINIC | Age: 54
End: 2024-06-12
Payer: MEDICAID

## 2024-06-12 DIAGNOSIS — N18.31 STAGE 3A CHRONIC KIDNEY DISEASE: Primary | ICD-10-CM

## 2024-06-12 RX ORDER — ROSUVASTATIN CALCIUM 20 MG/1
20 TABLET, COATED ORAL NIGHTLY
Qty: 30 TABLET | Refills: 5 | Status: SHIPPED | OUTPATIENT
Start: 2024-06-12

## 2024-06-12 NOTE — TELEPHONE ENCOUNTER
----- Message from Selena Duarte sent at 6/12/2024 10:22 AM CDT -----  rosuvastatin (CRESTOR) 20 MG tablet - - 3/6/2024 -  Sig: Take 20 mg by mouth every evening.    Please send refill to [facility] shop

## 2024-06-25 ENCOUNTER — OFFICE VISIT (OUTPATIENT)
Dept: FAMILY MEDICINE | Facility: CLINIC | Age: 54
End: 2024-06-25
Payer: MEDICAID

## 2024-06-25 ENCOUNTER — TELEPHONE (OUTPATIENT)
Dept: FAMILY MEDICINE | Facility: CLINIC | Age: 54
End: 2024-06-25

## 2024-06-25 ENCOUNTER — PATIENT MESSAGE (OUTPATIENT)
Dept: ADMINISTRATIVE | Facility: HOSPITAL | Age: 54
End: 2024-06-25
Payer: MEDICAID

## 2024-06-25 ENCOUNTER — HOSPITAL ENCOUNTER (OUTPATIENT)
Dept: RADIOLOGY | Facility: HOSPITAL | Age: 54
Discharge: HOME | End: 2024-06-25
Payer: MEDICAID

## 2024-06-25 VITALS
RESPIRATION RATE: 16 BRPM | SYSTOLIC BLOOD PRESSURE: 110 MMHG | BODY MASS INDEX: 24.26 KG/M2 | DIASTOLIC BLOOD PRESSURE: 72 MMHG | TEMPERATURE: 99 F | HEIGHT: 65 IN | WEIGHT: 145.63 LBS | OXYGEN SATURATION: 98 % | HEART RATE: 92 BPM

## 2024-06-25 DIAGNOSIS — R10.31 RIGHT GROIN PAIN: ICD-10-CM

## 2024-06-25 DIAGNOSIS — R74.8 ELEVATED LIVER ENZYMES: Primary | ICD-10-CM

## 2024-06-25 DIAGNOSIS — M79.604 ACUTE LEG PAIN, RIGHT: Primary | ICD-10-CM

## 2024-06-25 DIAGNOSIS — M25.551 RIGHT HIP PAIN: ICD-10-CM

## 2024-06-25 DIAGNOSIS — N18.31 STAGE 3A CHRONIC KIDNEY DISEASE: ICD-10-CM

## 2024-06-25 PROCEDURE — 3078F DIAST BP <80 MM HG: CPT | Mod: CPTII,,,

## 2024-06-25 PROCEDURE — 1159F MED LIST DOCD IN RCRD: CPT | Mod: CPTII,,,

## 2024-06-25 PROCEDURE — 73502 X-RAY EXAM HIP UNI 2-3 VIEWS: CPT | Mod: TC,RT

## 2024-06-25 PROCEDURE — 99214 OFFICE O/P EST MOD 30 MIN: CPT | Mod: ,,,

## 2024-06-25 PROCEDURE — 3044F HG A1C LEVEL LT 7.0%: CPT | Mod: CPTII,,,

## 2024-06-25 PROCEDURE — 1160F RVW MEDS BY RX/DR IN RCRD: CPT | Mod: CPTII,,,

## 2024-06-25 PROCEDURE — 3008F BODY MASS INDEX DOCD: CPT | Mod: CPTII,,,

## 2024-06-25 PROCEDURE — 3074F SYST BP LT 130 MM HG: CPT | Mod: CPTII,,,

## 2024-06-25 RX ORDER — TIZANIDINE 2 MG/1
2 TABLET ORAL EVERY 8 HOURS PRN
Qty: 21 TABLET | Refills: 0 | Status: SHIPPED | OUTPATIENT
Start: 2024-06-25 | End: 2024-07-02

## 2024-06-25 RX ORDER — IBUPROFEN 800 MG/1
800 TABLET ORAL 3 TIMES DAILY
Qty: 21 TABLET | Refills: 0 | Status: SHIPPED | OUTPATIENT
Start: 2024-06-25 | End: 2024-07-02

## 2024-06-25 RX ORDER — GABAPENTIN 300 MG/1
300 CAPSULE ORAL 2 TIMES DAILY
COMMUNITY
End: 2024-06-25

## 2024-06-25 NOTE — PROGRESS NOTES
Patient ID: [id number]     Chief Complaint: RLE pain (Pain x1 week, pain is worsening)    HPI:     Rosemarie Pretty, a 54-year-old woman, is present for a follow-up appointment today. She has been dealing with intense pain in her right leg that has been getting worse in the last week. The pain starts in her groin, spreads to her right hip, and results in knee pain. She has not experienced any recent injuries to her right leg. Additionally, she has had a fever of 101.4. There have been no complaints of difficulty breathing. Currently, her pain is rated at 7 out of 10.    Past Medical History:   Diagnosis Date    Abdominal hernia with obstruction and without gangrene     Anxiety disorder, unspecified     Bipolar depression     C. difficile colitis     Chronic anxiety     Degenerative disc disease, cervical     Depression     Elevated LFTs 2024    Family history of breast cancer in first degree relative     heavy maternal side history    Family history of systemic lupus erythematosus     Fibromyalgia     GERD (gastroesophageal reflux disease)     History of necrotizing fasciitis     HLD (hyperlipidemia)     Hydronephrosis     IBS (irritable bowel syndrome)     Insomnia     Migraines     Mild depression     Onychomycosis     Recurrent sinusitis     Renal cyst     Sigmoid diverticulosis     Tension-type headache     Thoracic degenerative disc disease     Traumatic injury of back         Past Surgical History:   Procedure Laterality Date    ABDOMINOPLASTY  2003    Reconstruction from Necrotizing Fascitis    APPENDECTOMY  2010    CERVICAL SPINE SURGERY  2004     SECTION       SECTION       SECTION       SECTION      CHOLECYSTECTOMY      COLONOSCOPY      Dr Chio Santos    CYSTOSCOPY W/ URETERAL STENT PLACEMENT Right 2017    Dr Arpit Duarte    CYSTOSCOPY W/ URETERAL STENT PLACEMENT  2017    Dr Arpit Duarte    DEBRIDEMENT, ABDOMINAL WALL, FOR  NECROTIZING SOFT TISSUE INFECTION      REFRACTIVE SURGERY      ROBOT-ASSISTED LAPAROSCOPIC PYELOPLASTY Right 2017    Dr Davonte Cohen    THORACIC SPINE SURGERY  2006    TONSILLECTOMY  1971    TOTAL ABDOMINAL HYSTERECTOMY W/ BILATERAL SALPINGOOPHORECTOMY  2002      Social History     Socioeconomic History    Marital status:    Tobacco Use    Smoking status: Former     Current packs/day: 0.00     Average packs/day: 0.5 packs/day for 8.0 years (4.0 ttl pk-yrs)     Types: Cigarettes     Start date:      Quit date:      Years since quittin.4    Smokeless tobacco: Never   Substance and Sexual Activity    Alcohol use: Yes     Comment: occassional    Drug use: Never    Sexual activity: Yes     Birth control/protection: See Surgical Hx   Social History Narrative    ** Merged History Encounter **          Social Determinants of Health     Financial Resource Strain: High Risk (2024)    Overall Financial Resource Strain (CARDIA)     Difficulty of Paying Living Expenses: Hard   Food Insecurity: Food Insecurity Present (2024)    Hunger Vital Sign     Worried About Running Out of Food in the Last Year: Often true     Ran Out of Food in the Last Year: Sometimes true   Transportation Needs: No Transportation Needs (2024)    PRAPARE - Transportation     Lack of Transportation (Medical): No     Lack of Transportation (Non-Medical): No   Physical Activity: Sufficiently Active (2024)    Exercise Vital Sign     Days of Exercise per Week: 5 days     Minutes of Exercise per Session: 30 min   Stress: No Stress Concern Present (2024)    [employer] - Occupational Stress Questionnaire     Feeling of Stress : Only a little   Housing Stability: Low Risk  (2024)    Housing Stability Vital Sign     Unable to Pay for Housing in the Last Year: No     Homeless in the Last Year: No        Current Outpatient Medications   Medication Instructions    ARIPiprazole  "(ABILIFY) 10 mg, Oral, Daily    DULoxetine (CYMBALTA) 60 mg, Oral, Daily    hydrOXYzine pamoate (VISTARIL) 25 mg, Oral, 3 times daily PRN    ibuprofen (ADVIL,MOTRIN) 800 mg, Oral, 3 times daily    lamoTRIgine (LAMICTAL) 200 mg, Oral, Daily    ondansetron (ZOFRAN-ODT) 4 mg, Oral, Every 4 hours PRN    pregabalin (LYRICA) 75 mg, Oral, 2 times daily    rosuvastatin (CRESTOR) 20 mg, Oral, Nightly    tiZANidine (ZANAFLEX) 2 mg, Oral, Every 8 hours PRN    topiramate (TOPAMAX) 100 mg, Oral, 2 times daily    traZODone (DESYREL) 200 mg, Oral, Nightly PRN    VENTOLIN HFA 90 mcg/actuation inhaler 1 puff, Inhalation, Every 4 hours PRN       Review of patient's allergies indicates:   Allergen Reactions    Latex Other (See Comments)     Other reaction(s): Blisters/Breaks down skin  "It eats through my skin"          Patient Care Team:  Jose Elias Molina DO as PCP - General (Family Medicine)  Tyrone Velazco MD (Orthopedic Surgery)  Chio Santos III, MD as Consulting Physician (Gastroenterology)  [facility] - (Psychiatry)     Subjective:     Review of Systems    12 point review of systems conducted, negative except as stated in the history of present illness. See HPI for details.    Objective:     Visit Vitals  /72 (BP Location: Right arm, Patient Position: Sitting, BP Method: Large (Automatic))   Pulse 92   Temp 98.7 °F (37.1 °C)   Resp 16   Ht 5' 5" (1.651 m)   Wt 66 kg (145 lb 9.6 oz)   SpO2 98%   BMI 24.23 kg/m²       Physical Exam  Vitals and nursing note reviewed.   Constitutional:       General: She is not in acute distress.     Appearance: She is not ill-appearing.   HENT:      Head: Normocephalic and atraumatic.      Mouth/Throat:      Mouth: Mucous membranes are moist.      Pharynx: Oropharynx is clear.   Eyes:      General: No scleral icterus.     Extraocular Movements: Extraocular movements intact.      Conjunctiva/sclera: Conjunctivae normal.      Pupils: Pupils are equal, " round, and reactive to light.   Neck:      Vascular: No carotid bruit.   Cardiovascular:      Rate and Rhythm: Normal rate and regular rhythm.      Heart sounds: No murmur heard.     No friction rub. No gallop.   Pulmonary:      Effort: Pulmonary effort is normal. No respiratory distress.      Breath sounds: Normal breath sounds. No wheezing, rhonchi or rales.   Abdominal:      General: Abdomen is flat. Bowel sounds are normal. There is no distension.      Palpations: Abdomen is soft. There is no mass.      Tenderness: There is no abdominal tenderness.   Musculoskeletal:         General: Normal range of motion.      Cervical back: Normal range of motion and neck supple.        Legs:       Comments: Right groin and right hip are tender to touch upon palpation.    Skin:     General: Skin is warm and dry.   Neurological:      General: No focal deficit present.      Mental Status: She is alert.   Psychiatric:         Mood and Affect: Mood normal.       Labs Reviewed:     Chemistry:  Lab Results   Component Value Date     06/25/2024    K 3.5 06/25/2024    BUN 9.0 (L) 06/25/2024    CREATININE 0.95 06/25/2024    EGFRNORACEVR >60 06/25/2024    GLUCOSE 87 06/25/2024    CALCIUM 8.8 06/25/2024    ALKPHOS 121 06/25/2024    LABPROT 7.3 06/25/2024    ALBUMIN 3.6 06/25/2024    BILIDIR 0.9 03/04/2022    IBILI 0.40 03/04/2022    AST 74 (H) 06/25/2024     (H) 06/25/2024    MG 2.00 10/01/2021    PHOS 1.9 (L) 10/01/2021    [id number] 30.9 02/28/2024    TSH 1.384 02/28/2024      Lab Results   Component Value Date    HGBA1C 5.0 02/28/2024      Hematology:  Lab Results   Component Value Date    WBC 6.69 05/10/2024    HGB 12.2 05/10/2024    HCT 38.4 05/10/2024     05/10/2024     Lipid Panel:  Lab Results   Component Value Date    CHOL 150 02/28/2024    HDL 54 02/28/2024    LDL 65.00 02/28/2024    TRIG 157 (H) 02/28/2024    TOTALCHOLEST 3 02/28/2024      Urine:  Lab Results   Component Value Date    APPEARANCEUA Clear  08/05/2023    SGUA 1.010 08/05/2023    PROTEINUA Negative 08/05/2023    KETONESUA Negative 08/05/2023    LEUKOCYTESUR Negative 08/05/2023    RBCUA 0-5 08/05/2023    WBCUA None Seen 08/05/2023    BACTERIA None Seen 08/05/2023        Assessment:       ICD-10-CM ICD-9-CM   1. Acute leg pain, right  M79.604 729.5   2. Right groin pain  R10.31 789.03   3. Stage 3a chronic kidney disease  N18.31 585.3   4. Right hip pain  M25.551 719.45        Plan:     1. Acute leg pain, right  -     D-Dimer, Quantitative; Future; Expected date: 06/25/2024  -      Lower Extremity Veins Right; Future; Expected date: 06/25/2024  -     ibuprofen (ADVIL,MOTRIN) 800 MG tablet; Take 1 tablet (800 mg total) by mouth 3 (three) times daily. for 7 days  Dispense: 21 tablet; Refill: 0  -     tiZANidine (ZANAFLEX) 2 MG tablet; Take 1 tablet (2 mg total) by mouth every 8 (eight) hours as needed (muscle spasms).  Dispense: 21 tablet; Refill: 0    2. Right groin pain  -     D-Dimer, Quantitative; Future; Expected date: 06/25/2024  -      Lower Extremity Veins Right; Future; Expected date: 06/25/2024    3. Stage 3a chronic kidney disease  -     Microalbumin/Creatinine Ratio, Urine; Future; Expected date: 06/25/2024  -     Comprehensive Metabolic Panel; Future; Expected date: 06/25/2024    4. Right hip pain  -     X-Ray Hip 2 or 3 views Right with Pelvis when performed; Future; Expected date: 06/25/2024  -     ibuprofen (ADVIL,MOTRIN) 800 MG tablet; Take 1 tablet (800 mg total) by mouth 3 (three) times daily. for 7 days  Dispense: 21 tablet; Refill: 0  -     tiZANidine (ZANAFLEX) 2 MG tablet; Take 1 tablet (2 mg total) by mouth every 8 (eight) hours as needed (muscle spasms).  Dispense: 21 tablet; Refill: 0      Follow up if symptoms worsen or fail to improve. In addition to their scheduled follow up, the patient has also been instructed to follow up on as needed basis.     Future Appointments   Date Time Provider Department Center   6/26/2024  1:00  PM OA US1 [facility] ULTRA [facility]   7/1/2024  7:30 AM [facility] MAMMO1 [facility] ALFAO Steve Shafer NP

## 2024-06-25 NOTE — LETTER
AUTHORIZATION FOR RELEASE OF   CONFIDENTIAL INFORMATION    Dear Dr Santos,    We are seeing Rosemarie Pretty, date of birth 1970, in the clinic at [facility]. JUSTINO Juarez is the patient's provider. Rosemarie Pretty has an outstanding lab/procedure at the time we reviewed her chart. In order to help keep her health information updated, she has authorized us to request the following medical record(s):        (  )  MAMMOGRAM                                      (  x)  COLONOSCOPY      (  )  PAP SMEAR                                          (  )  OUTSIDE LAB RESULTS     (  )  DEXA SCAN                                          (  )  EYE EXAM            (  )  FOOT EXAM                                          (  )  ENTIRE RECORD     (  )  OUTSIDE IMMUNIZATIONS                 (  )  _______________         Please fax records to [facility], Ka'Ryn Franklin, AGNP-C, [phone]     If you have any questions, please contact ERICA Ng at [phone]          Patient Name: Rosemarie Pretty  : 1970  Patient Phone #: [phone]

## 2024-06-25 NOTE — LETTER
AUTHORIZATION FOR RELEASE OF   CONFIDENTIAL INFORMATION    Dear ***,    We are seeing Rosemarie Pretty, date of birth 1970, in the clinic at [facility]. Jose Elias Molina DO is the patient's PCP. Rosemarie Pretty has an outstanding lab/procedure at the time we reviewed her chart. In order to help keep her health information updated, she has authorized us to request the following medical record(s):        (  )  MAMMOGRAM                                      (  )  COLONOSCOPY      (  )  PAP SMEAR                                          (  )  OUTSIDE LAB RESULTS     (  )  DEXA SCAN                                          (  )  EYE EXAM            (  )  FOOT EXAM                                          (  )  ENTIRE RECORD     (  )  OUTSIDE IMMUNIZATIONS                 (  )  _______________         Please fax records to [facility], Quebodeaux, Quinn, DO, ***     If you have any questions, please contact *** at [phone]*.           Patient Name: Rosemarie Pretty  : 1970  Patient Phone #: [phone]

## 2024-06-26 ENCOUNTER — DOCUMENTATION ONLY (OUTPATIENT)
Dept: FAMILY MEDICINE | Facility: CLINIC | Age: 54
End: 2024-06-26
Payer: MEDICAID

## 2024-06-26 ENCOUNTER — HOSPITAL ENCOUNTER (OUTPATIENT)
Dept: RADIOLOGY | Facility: HOSPITAL | Age: 54
Discharge: HOME | End: 2024-06-26
Payer: MEDICAID

## 2024-06-26 DIAGNOSIS — Z12.11 SCREENING FOR COLON CANCER: ICD-10-CM

## 2024-06-26 DIAGNOSIS — R10.31 RIGHT GROIN PAIN: ICD-10-CM

## 2024-06-26 DIAGNOSIS — M79.604 ACUTE LEG PAIN, RIGHT: ICD-10-CM

## 2024-06-26 PROCEDURE — 93971 EXTREMITY STUDY: CPT | Mod: TC,RT

## 2024-06-26 NOTE — TELEPHONE ENCOUNTER
I discussed the ultrasound of the patient's right leg and the lab work she completed yesterday. The lab results were normal except for elevated liver enzymes, likely due to alcohol consumption the night before. We will reevaluate her Comprehensive Metabolic Panel in two weeks to monitor her liver function. She reported feeling better today after taking Ibuprofen and Zanaflex together.

## 2024-07-03 ENCOUNTER — HOSPITAL ENCOUNTER (EMERGENCY)
Facility: HOSPITAL | Age: 54
Discharge: HOME | End: 2024-07-03
Attending: INTERNAL MEDICINE
Payer: MEDICAID

## 2024-07-03 VITALS
TEMPERATURE: 98 F | HEART RATE: 80 BPM | RESPIRATION RATE: 16 BRPM | WEIGHT: 145 LBS | OXYGEN SATURATION: 99 % | SYSTOLIC BLOOD PRESSURE: 150 MMHG | BODY MASS INDEX: 24.16 KG/M2 | HEIGHT: 65 IN | DIASTOLIC BLOOD PRESSURE: 94 MMHG

## 2024-07-03 DIAGNOSIS — K30 INDIGESTION: ICD-10-CM

## 2024-07-03 DIAGNOSIS — G43.011 INTRACTABLE MIGRAINE WITHOUT AURA AND WITH STATUS MIGRAINOSUS: Primary | ICD-10-CM

## 2024-07-03 LAB
ALBUMIN SERPL-MCNC: 3.8 G/DL (ref 3.5–5)
ALBUMIN/GLOB SERPL: 1.1 RATIO (ref 1.1–2)
ALP SERPL-CCNC: 101 UNIT/L (ref 40–150)
ALT SERPL-CCNC: 38 UNIT/L (ref 0–55)
ANION GAP SERPL CALC-SCNC: 8 MEQ/L
AST SERPL-CCNC: 26 UNIT/L (ref 5–34)
BASOPHILS # BLD AUTO: 0.03 X10(3)/MCL
BASOPHILS NFR BLD AUTO: 0.5 %
BILIRUB SERPL-MCNC: 0.5 MG/DL
BUN SERPL-MCNC: 14 MG/DL (ref 9.8–20.1)
CALCIUM SERPL-MCNC: 9.6 MG/DL (ref 8.4–10.2)
CHLORIDE SERPL-SCNC: 111 MMOL/L (ref 98–107)
CO2 SERPL-SCNC: 21 MMOL/L (ref 22–29)
CREAT SERPL-MCNC: 1.23 MG/DL (ref 0.55–1.02)
CREAT/UREA NIT SERPL: 11
EOSINOPHIL # BLD AUTO: 0.12 X10(3)/MCL (ref 0–0.9)
EOSINOPHIL NFR BLD AUTO: 1.8 %
ERYTHROCYTE [DISTWIDTH] IN BLOOD BY AUTOMATED COUNT: 13 % (ref 11.5–17)
GFR SERPLBLD CREATININE-BSD FMLA CKD-EPI: 52 ML/MIN/1.73/M2
GLOBULIN SER-MCNC: 3.5 GM/DL (ref 2.4–3.5)
GLUCOSE SERPL-MCNC: 95 MG/DL (ref 74–100)
HCT VFR BLD AUTO: 34.9 % (ref 37–47)
HGB BLD-MCNC: 11.6 G/DL (ref 12–16)
IMM GRANULOCYTES # BLD AUTO: 0.05 X10(3)/MCL (ref 0–0.04)
IMM GRANULOCYTES NFR BLD AUTO: 0.8 %
LYMPHOCYTES # BLD AUTO: 3.16 X10(3)/MCL (ref 0.6–4.6)
LYMPHOCYTES NFR BLD AUTO: 48.2 %
MCH RBC QN AUTO: 30.8 PG (ref 27–31)
MCHC RBC AUTO-ENTMCNC: 33.2 G/DL (ref 33–36)
MCV RBC AUTO: 92.6 FL (ref 80–94)
MONOCYTES # BLD AUTO: 0.36 X10(3)/MCL (ref 0.1–1.3)
MONOCYTES NFR BLD AUTO: 5.5 %
NEUTROPHILS # BLD AUTO: 2.84 X10(3)/MCL (ref 2.1–9.2)
NEUTROPHILS NFR BLD AUTO: 43.2 %
OHS QRS DURATION: 80 MS
OHS QTC CALCULATION: 463 MS
PLATELET # BLD AUTO: 250 X10(3)/MCL (ref 130–400)
PMV BLD AUTO: 9.1 FL (ref 7.4–10.4)
POTASSIUM SERPL-SCNC: 3.7 MMOL/L (ref 3.5–5.1)
PROT SERPL-MCNC: 7.3 GM/DL (ref 6.4–8.3)
RBC # BLD AUTO: 3.77 X10(6)/MCL (ref 4.2–5.4)
SODIUM SERPL-SCNC: 140 MMOL/L (ref 136–145)
WBC # BLD AUTO: 6.56 X10(3)/MCL (ref 4.5–11.5)

## 2024-07-03 PROCEDURE — 96374 THER/PROPH/DIAG INJ IV PUSH: CPT

## 2024-07-03 PROCEDURE — 93010 ELECTROCARDIOGRAM REPORT: CPT | Mod: ,,, | Performed by: INTERNAL MEDICINE

## 2024-07-03 PROCEDURE — 80053 COMPREHEN METABOLIC PANEL: CPT

## 2024-07-03 PROCEDURE — 96375 TX/PRO/DX INJ NEW DRUG ADDON: CPT

## 2024-07-03 PROCEDURE — 85025 COMPLETE CBC W/AUTO DIFF WBC: CPT

## 2024-07-03 PROCEDURE — 63600175 PHARM REV CODE 636 W HCPCS

## 2024-07-03 PROCEDURE — 93005 ELECTROCARDIOGRAM TRACING: CPT

## 2024-07-03 PROCEDURE — 99284 EMERGENCY DEPT VISIT MOD MDM: CPT | Mod: 25

## 2024-07-03 PROCEDURE — 96361 HYDRATE IV INFUSION ADD-ON: CPT

## 2024-07-03 PROCEDURE — 25000003 PHARM REV CODE 250

## 2024-07-03 RX ORDER — MORPHINE SULFATE 4 MG/ML
2 INJECTION, SOLUTION INTRAMUSCULAR; INTRAVENOUS
Status: COMPLETED | OUTPATIENT
Start: 2024-07-03 | End: 2024-07-03

## 2024-07-03 RX ORDER — ONDANSETRON HYDROCHLORIDE 2 MG/ML
4 INJECTION, SOLUTION INTRAVENOUS
Status: COMPLETED | OUTPATIENT
Start: 2024-07-03 | End: 2024-07-03

## 2024-07-03 RX ORDER — ONDANSETRON 4 MG/1
4 TABLET, ORALLY DISINTEGRATING ORAL
Status: DISCONTINUED | OUTPATIENT
Start: 2024-07-03 | End: 2024-07-03

## 2024-07-03 RX ORDER — BUTALBITAL, ACETAMINOPHEN AND CAFFEINE 50; 325; 40 MG/1; MG/1; MG/1
1 TABLET ORAL
Status: COMPLETED | OUTPATIENT
Start: 2024-07-03 | End: 2024-07-03

## 2024-07-03 RX ORDER — SUMATRIPTAN SUCCINATE 25 MG/1
25 TABLET ORAL 4 TIMES DAILY PRN
Qty: 120 TABLET | Refills: 0 | Status: SHIPPED | OUTPATIENT
Start: 2024-07-03 | End: 2024-07-03

## 2024-07-03 RX ORDER — SUMATRIPTAN SUCCINATE 25 MG/1
25 TABLET ORAL 4 TIMES DAILY PRN
Qty: 120 TABLET | Refills: 0 | Status: SHIPPED | OUTPATIENT
Start: 2024-07-03 | End: 2024-08-02

## 2024-07-03 RX ORDER — BUTALBITAL, ACETAMINOPHEN AND CAFFEINE 50; 325; 40 MG/1; MG/1; MG/1
1 TABLET ORAL EVERY 6 HOURS PRN
Qty: 120 TABLET | Refills: 0 | Status: SHIPPED | OUTPATIENT
Start: 2024-07-03 | End: 2024-08-02

## 2024-07-03 RX ORDER — BUTALBITAL, ACETAMINOPHEN AND CAFFEINE 50; 325; 40 MG/1; MG/1; MG/1
1 TABLET ORAL EVERY 6 HOURS PRN
Qty: 120 TABLET | Refills: 0 | Status: SHIPPED | OUTPATIENT
Start: 2024-07-03 | End: 2024-07-03

## 2024-07-03 RX ORDER — KETOROLAC TROMETHAMINE 30 MG/ML
30 INJECTION, SOLUTION INTRAMUSCULAR; INTRAVENOUS
Status: COMPLETED | OUTPATIENT
Start: 2024-07-03 | End: 2024-07-03

## 2024-07-03 RX ADMIN — SODIUM CHLORIDE 1000 ML: 9 INJECTION, SOLUTION INTRAVENOUS at 05:07

## 2024-07-03 RX ADMIN — BUTALBITAL, ACETAMINOPHEN, AND CAFFEINE 1 TABLET: 50; 325; 40 TABLET ORAL at 04:07

## 2024-07-03 RX ADMIN — KETOROLAC TROMETHAMINE 30 MG: 30 INJECTION, SOLUTION INTRAMUSCULAR at 05:07

## 2024-07-03 RX ADMIN — ONDANSETRON 4 MG: 2 INJECTION INTRAMUSCULAR; INTRAVENOUS at 06:07

## 2024-07-03 RX ADMIN — MORPHINE SULFATE 2 MG: 4 INJECTION, SOLUTION INTRAMUSCULAR; INTRAVENOUS at 06:07

## 2024-07-03 NOTE — DISCHARGE INSTRUCTIONS
Imitrex and Fioricet for migraines. Follow-up with PCP for further migraine management. Return with new or worsening symptoms.

## 2024-07-03 NOTE — ED PROVIDER NOTES
"Encounter Date: 7/3/2024       History     Chief Complaint   Patient presents with    Headache     C/o headache, indigestion, and feeling "jittery". States her BP was 155/100 at home.     See MDM.     The history is provided by the patient. No  was used.     Review of patient's allergies indicates:   Allergen Reactions    Latex Other (See Comments)     Other reaction(s): Blisters/Breaks down skin  "It eats through my skin"       Past Medical History:   Diagnosis Date    Abdominal hernia with obstruction and without gangrene     Anxiety disorder, unspecified     Bipolar depression     C. difficile colitis     Chronic anxiety     Degenerative disc disease, cervical     Depression     Elevated LFTs 2024    Family history of breast cancer in first degree relative     heavy maternal side history    Family history of systemic lupus erythematosus     Fibromyalgia     GERD (gastroesophageal reflux disease)     History of necrotizing fasciitis     HLD (hyperlipidemia)     Hydronephrosis     IBS (irritable bowel syndrome)     Insomnia     Migraines     Mild depression     Onychomycosis     Recurrent sinusitis     Renal cyst     Sigmoid diverticulosis     Tension-type headache     Thoracic degenerative disc disease     Traumatic injury of back      Past Surgical History:   Procedure Laterality Date    ABDOMINOPLASTY  2003    Reconstruction from Necrotizing Fascitis    APPENDECTOMY  2010    CERVICAL SPINE SURGERY       SECTION       SECTION       SECTION       SECTION      CHOLECYSTECTOMY      COLONOSCOPY      Dr Chio Santos    CYSTOSCOPY W/ URETERAL STENT PLACEMENT Right 2017    Dr Arpit Duarte    CYSTOSCOPY W/ URETERAL STENT PLACEMENT  2017    Dr Arpit Duarte    DEBRIDEMENT, ABDOMINAL WALL, FOR NECROTIZING SOFT TISSUE INFECTION      REFRACTIVE SURGERY      ROBOT-ASSISTED LAPAROSCOPIC PYELOPLASTY Right 2017    " Dr Davonte Cohen    THORACIC SPINE SURGERY  2006    TONSILLECTOMY  1971    TOTAL ABDOMINAL HYSTERECTOMY W/ BILATERAL SALPINGOOPHORECTOMY  2002     Family History   Problem Relation Name Age of Onset    Alcohol abuse Mother      Parkinsonism Mother      Psychosis Mother      Hypertension Mother      Anxiety disorder Mother      Hyperlipidemia Mother      Leukemia Mother  67        Cause of death    Lymphoma Mother  67        Cause of death    Breast cancer Mother      Heart failure Mother      Alcohol abuse Father      Mitral valve prolapse Father      Anxiety disorder Father      Psychosis Father      Mitral valve prolapse Sister      Alcohol abuse Sister      Breast cancer Maternal Grandmother      Breast cancer Maternal Aunt      Breast cancer Maternal Aunt      Breast cancer Maternal Aunt       Social History     Tobacco Use    Smoking status: Former     Current packs/day: 0.00     Average packs/day: 0.5 packs/day for 8.0 years (4.0 ttl pk-yrs)     Types: Cigarettes     Start date:      Quit date:      Years since quittin.5    Smokeless tobacco: Never   Substance Use Topics    Alcohol use: Yes     Comment: occassional    Drug use: Never     Review of Systems   Constitutional:  Negative for chills and fever.   Eyes:  Positive for visual disturbance.   Respiratory:  Negative for shortness of breath.    Cardiovascular:  Negative for chest pain.   Gastrointestinal:  Negative for abdominal pain, blood in stool, constipation, diarrhea, nausea and vomiting.   Genitourinary:  Negative for dysuria and hematuria.   Neurological:  Positive for light-headedness and headaches.   All other systems reviewed and are negative.      Physical Exam     Initial Vitals [24 1546]   BP Pulse Resp Temp SpO2   119/86 104 18 98 °F (36.7 °C) 99 %      MAP       --         Physical Exam    Nursing note and vitals reviewed.  Constitutional: She appears well-developed and well-nourished. She is not diaphoretic. No distress.    HENT:   Head: Normocephalic and atraumatic.   Eyes: Conjunctivae and EOM are normal. Pupils are equal, round, and reactive to light. Right eye exhibits no discharge. Left eye exhibits no discharge.   Cardiovascular:  Normal rate and intact distal pulses.           Pulmonary/Chest: No respiratory distress.   Musculoskeletal:         General: Normal range of motion.     Neurological: She is alert and oriented to person, place, and time.   Skin: Skin is warm and dry.   Psychiatric: She has a normal mood and affect. Her behavior is normal.         ED Course   Procedures  Labs Reviewed   COMPREHENSIVE METABOLIC PANEL - Abnormal; Notable for the following components:       Result Value    Chloride 111 (*)     CO2 21 (*)     Creatinine 1.23 (*)     All other components within normal limits   CBC WITH DIFFERENTIAL - Abnormal; Notable for the following components:    RBC 3.77 (*)     Hgb 11.6 (*)     Hct 34.9 (*)     IG# 0.05 (*)     All other components within normal limits   CBC W/ AUTO DIFFERENTIAL    Narrative:     The following orders were created for panel order CBC auto differential.  Procedure                               Abnormality         Status                     ---------                               -----------         ------                     CBC with Differential[[id number]]       Abnormal            Final result                 Please view results for these tests on the individual orders.     EKG Readings: (Independently Interpreted)   Initial Reading: No STEMI. Rhythm: Normal Sinus Rhythm. Heart Rate: 77. Ectopy: No Ectopy. Conduction: Normal. ST Segments: Normal ST Segments. T Waves: Normal. Clinical Impression: Normal Sinus Rhythm     ECG Results              EKG 12-lead (Preliminary result)  Result time 07/03/24 19:09:20      Wet Read by Munir Theodore MD (07/03/24 19:09:20, [facility] - Emergency Dept, Emergency Medicine)    EKG Initial Reading: Independently Interpreted by Munir  MD Ewelina. independently as: No STEMI. Rhythm: Normal Sinus Rhythm, Rate 77. Ectopy: No Ectopy. Conduction: Normal. ST Segments: Normal ST Segments. T Waves: Normal. Axis: Normal.                                      Imaging Results    None          Medications   butalbital-acetaminophen-caffeine -40 mg per tablet 1 tablet (1 tablet Oral Given 7/3/24 1612)   sodium chloride 0.9% bolus 1,000 mL 1,000 mL ( Intravenous Stopped 7/3/24 1820)   ketorolac injection 30 mg (30 mg Intravenous Given 7/3/24 1713)   morphine injection 2 mg (2 mg Intravenous Given 7/3/24 1812)   ondansetron injection 4 mg (4 mg Intravenous Given 7/3/24 1843)     Medical Decision Making  Pt is a 55 y/o female with hx of migraines, depression/anxiety who presents with elevated BP reading, jitters and migraine this afternoon. Notes that she has hx of migraines, takes fioracet and imitrex but is out of medication, and that this is how her typical migraines present. States pain is a 7-8/10, worse with bright lights. Has flushing, vision changes, nausea. States that blood pressure is normally 109/60's but this afternoon got up to 155/100. No hx of BP problems. State had pizza and coke this afternoon, otherwise denies any other food or caffeine intake. States that she gets light headed when moving from sitting to standing position too quickly. Recently got over pneumonia about two weeks ago. Still having congestion otherwise denies fever/chills. Denies new medications or exposure. Takes abilify, lamictal, duloxetine, hydroxyzine, trazodone. States is also having indigestion sx since this morning. Took tums without significant relief. States that when she comes to the hospital for persistent migraine they usually give her different medicines and end up giving morphine to relieve the pain. States that this happens a few times per year and is the same course every time.     EKG unremarkable. No white count or significant anemia on CBC. Creatinine  1.23, gap 8. IVF given in ED. Pain improved with morphine. Will send home on home migraine meds, advised pt to follow up with PCP for further management. Patient expressed understanding and was in agreement with the plan.     Amount and/or Complexity of Data Reviewed  Labs: ordered. Decision-making details documented in ED Course.    Risk  Prescription drug management.      Additional MDM:   Differential Diagnosis:   Other: The following diagnoses were also considered and will be evaluated: electrolyte abnormality, migraine headache and viral uri.            ED Course as of 07/03/24 1910 Wed Jul 03, 2024   1724 Creatinine(!): 1.23 [ME]   1725 Anion Gap: 8.0 [ME]   1725 WBC: 6.56 [ME]   1824 Pain improved to 4/10 [ME]      ED Course User Index  [ME] Irlanda Yin PA                             Clinical Impression:  Final diagnoses:  [K30] Indigestion  [G43.011] Intractable migraine without aura and with status migrainosus (Primary)          ED Disposition Condition    Discharge Stable          ED Prescriptions       Medication Sig Dispense Start Date End Date Auth. Provider    butalbital-acetaminophen-caffeine -40 mg (FIORICET, ESGIC) -40 mg per tablet  (Status: Discontinued) Take 1 tablet by mouth every 6 (six) hours as needed for Pain. 120 tablet 7/3/2024 7/3/2024 Irlanda Yin PA    sumatriptan (IMITREX) 25 MG Tab  (Status: Discontinued) Take 1 tablet (25 mg total) by mouth 4 (four) times daily as needed (migraine). 120 tablet 7/3/2024 7/3/2024 Irlanda Yin PA    butalbital-acetaminophen-caffeine -40 mg (FIORICET, ESGIC) -40 mg per tablet Take 1 tablet by mouth every 6 (six) hours as needed for Pain. 120 tablet 7/3/2024 8/2/2024 Irlanda Yin PA    sumatriptan (IMITREX) 25 MG Tab Take 1 tablet (25 mg total) by mouth 4 (four) times daily as needed (migraine). 120 tablet 7/3/2024 8/2/2024 Irlanda Yin PA          Follow-up Information       Follow up With Specialties  Details Why Contact Info    Jose Elias Molina DO Family Medicine Call in 1 week  [address] [facility] [address]  [phone]               Irlanda Yin PA  07/03/24 1839       Irlanda Yin PA  07/03/24 1910

## 2024-08-05 DIAGNOSIS — M25.562 LEFT KNEE PAIN: Primary | ICD-10-CM

## 2024-08-07 DIAGNOSIS — Z12.31 ENCOUNTER FOR SCREENING MAMMOGRAM FOR BREAST CANCER: Primary | ICD-10-CM

## 2024-08-14 ENCOUNTER — HOSPITAL ENCOUNTER (EMERGENCY)
Facility: HOSPITAL | Age: 54
Discharge: HOME | End: 2024-08-14
Attending: INTERNAL MEDICINE
Payer: MEDICAID

## 2024-08-14 VITALS
RESPIRATION RATE: 18 BRPM | TEMPERATURE: 98 F | BODY MASS INDEX: 26.12 KG/M2 | HEIGHT: 64 IN | SYSTOLIC BLOOD PRESSURE: 113 MMHG | DIASTOLIC BLOOD PRESSURE: 75 MMHG | WEIGHT: 153 LBS | HEART RATE: 85 BPM | OXYGEN SATURATION: 98 %

## 2024-08-14 DIAGNOSIS — E87.6 HYPOKALEMIA: Primary | ICD-10-CM

## 2024-08-14 DIAGNOSIS — G25.81 RESTLESS LEG: ICD-10-CM

## 2024-08-14 DIAGNOSIS — D64.9 ANEMIA, UNSPECIFIED TYPE: ICD-10-CM

## 2024-08-14 LAB
ALBUMIN SERPL-MCNC: 3.5 G/DL (ref 3.5–5)
ALBUMIN/GLOB SERPL: 1.1 RATIO (ref 1.1–2)
ALP SERPL-CCNC: 109 UNIT/L (ref 40–150)
ALT SERPL-CCNC: 33 UNIT/L (ref 0–55)
ANION GAP SERPL CALC-SCNC: 9 MEQ/L
AST SERPL-CCNC: 20 UNIT/L (ref 5–34)
BACTERIA #/AREA URNS AUTO: NORMAL /HPF
BASOPHILS # BLD AUTO: 0.06 X10(3)/MCL
BASOPHILS NFR BLD AUTO: 0.8 %
BILIRUB SERPL-MCNC: 0.2 MG/DL
BILIRUB UR QL STRIP.AUTO: NEGATIVE
BUN SERPL-MCNC: 8 MG/DL (ref 9.8–20.1)
CALCIUM SERPL-MCNC: 9.1 MG/DL (ref 8.4–10.2)
CHLORIDE SERPL-SCNC: 114 MMOL/L (ref 98–107)
CK SERPL-CCNC: 77 U/L (ref 29–168)
CLARITY UR: CLEAR
CO2 SERPL-SCNC: 17 MMOL/L (ref 22–29)
COLOR UR AUTO: YELLOW
CREAT SERPL-MCNC: 1.01 MG/DL (ref 0.55–1.02)
CREAT/UREA NIT SERPL: 8
EOSINOPHIL # BLD AUTO: 0.25 X10(3)/MCL (ref 0–0.9)
EOSINOPHIL NFR BLD AUTO: 3.5 %
ERYTHROCYTE [DISTWIDTH] IN BLOOD BY AUTOMATED COUNT: 13.2 % (ref 11.5–17)
GFR SERPLBLD CREATININE-BSD FMLA CKD-EPI: >60 ML/MIN/1.73/M2
GLOBULIN SER-MCNC: 3.2 GM/DL (ref 2.4–3.5)
GLUCOSE SERPL-MCNC: 95 MG/DL (ref 74–100)
GLUCOSE UR QL STRIP: NEGATIVE
HCT VFR BLD AUTO: 32.4 % (ref 37–47)
HGB BLD-MCNC: 10.5 G/DL (ref 12–16)
HGB UR QL STRIP: ABNORMAL
IMM GRANULOCYTES # BLD AUTO: 0.02 X10(3)/MCL (ref 0–0.04)
IMM GRANULOCYTES NFR BLD AUTO: 0.3 %
KETONES UR QL STRIP: NEGATIVE
LEUKOCYTE ESTERASE UR QL STRIP: NEGATIVE
LYMPHOCYTES # BLD AUTO: 3.01 X10(3)/MCL (ref 0.6–4.6)
LYMPHOCYTES NFR BLD AUTO: 42 %
MCH RBC QN AUTO: 31.6 PG (ref 27–31)
MCHC RBC AUTO-ENTMCNC: 32.4 G/DL (ref 33–36)
MCV RBC AUTO: 97.6 FL (ref 80–94)
MONOCYTES # BLD AUTO: 0.45 X10(3)/MCL (ref 0.1–1.3)
MONOCYTES NFR BLD AUTO: 6.3 %
NEUTROPHILS # BLD AUTO: 3.37 X10(3)/MCL (ref 2.1–9.2)
NEUTROPHILS NFR BLD AUTO: 47.1 %
NITRITE UR QL STRIP: NEGATIVE
PH UR STRIP: 6 [PH]
PLATELET # BLD AUTO: 274 X10(3)/MCL (ref 130–400)
PMV BLD AUTO: 9.1 FL (ref 7.4–10.4)
POTASSIUM SERPL-SCNC: 3.2 MMOL/L (ref 3.5–5.1)
PROT SERPL-MCNC: 6.7 GM/DL (ref 6.4–8.3)
PROT UR QL STRIP: NEGATIVE
RBC # BLD AUTO: 3.32 X10(6)/MCL (ref 4.2–5.4)
RBC #/AREA URNS AUTO: NORMAL /HPF
SODIUM SERPL-SCNC: 140 MMOL/L (ref 136–145)
SP GR UR STRIP.AUTO: <=1.005 (ref 1–1.03)
SQUAMOUS #/AREA URNS AUTO: NORMAL /HPF
UROBILINOGEN UR STRIP-ACNC: 0.2
WBC # BLD AUTO: 7.16 X10(3)/MCL (ref 4.5–11.5)
WBC #/AREA URNS AUTO: NORMAL /HPF

## 2024-08-14 PROCEDURE — 99283 EMERGENCY DEPT VISIT LOW MDM: CPT

## 2024-08-14 PROCEDURE — 81001 URINALYSIS AUTO W/SCOPE: CPT | Performed by: INTERNAL MEDICINE

## 2024-08-14 PROCEDURE — 85025 COMPLETE CBC W/AUTO DIFF WBC: CPT | Performed by: INTERNAL MEDICINE

## 2024-08-14 PROCEDURE — 25000003 PHARM REV CODE 250: Performed by: INTERNAL MEDICINE

## 2024-08-14 PROCEDURE — 81003 URINALYSIS AUTO W/O SCOPE: CPT | Performed by: INTERNAL MEDICINE

## 2024-08-14 PROCEDURE — 80053 COMPREHEN METABOLIC PANEL: CPT | Performed by: INTERNAL MEDICINE

## 2024-08-14 PROCEDURE — 82550 ASSAY OF CK (CPK): CPT | Performed by: INTERNAL MEDICINE

## 2024-08-14 RX ORDER — HYDROCODONE BITARTRATE AND ACETAMINOPHEN 5; 325 MG/1; MG/1
1 TABLET ORAL
Status: COMPLETED | OUTPATIENT
Start: 2024-08-14 | End: 2024-08-14

## 2024-08-14 RX ORDER — POTASSIUM CHLORIDE 750 MG/1
10 TABLET, EXTENDED RELEASE ORAL DAILY
Qty: 7 TABLET | Refills: 0 | Status: SHIPPED | OUTPATIENT
Start: 2024-08-14 | End: 2024-08-21

## 2024-08-14 RX ADMIN — POTASSIUM BICARBONATE 40 MEQ: 782 TABLET, EFFERVESCENT ORAL at 08:08

## 2024-08-14 RX ADMIN — HYDROCODONE BITARTRATE AND ACETAMINOPHEN 1 TABLET: 5; 325 TABLET ORAL at 07:08

## 2024-08-15 NOTE — ED PROVIDER NOTES
"Encounter Date: 2024       History     Chief Complaint   Patient presents with    Groin Pain     C//o groin pain & cam thigh pain x 1 day unrelieved with lyrica, tylenol & ibuprofen. States ache/burning pain. H/o fibromyalgia.      54-year-old white female states that she has got history of fibromyalgia but she can not keep her legs still in his got thigh cramping causing pain and she states her Lyrica is not helping      Review of patient's allergies indicates:   Allergen Reactions    Latex Other (See Comments)     Other reaction(s): Blisters/Breaks down skin  "It eats through my skin"       Past Medical History:   Diagnosis Date    Abdominal hernia with obstruction and without gangrene     Anxiety disorder, unspecified     Bipolar depression     C. difficile colitis     Chronic anxiety     Degenerative disc disease, cervical     Depression     Elevated LFTs 2024    Family history of breast cancer in first degree relative     heavy maternal side history    Family history of systemic lupus erythematosus     Fibromyalgia     GERD (gastroesophageal reflux disease)     History of necrotizing fasciitis     HLD (hyperlipidemia)     Hydronephrosis     IBS (irritable bowel syndrome)     Insomnia     Migraines     Mild depression     Onychomycosis     Recurrent sinusitis     Renal cyst     Sigmoid diverticulosis     Tension-type headache     Thoracic degenerative disc disease     Traumatic injury of back      Past Surgical History:   Procedure Laterality Date    ABDOMINOPLASTY  2003    Reconstruction from Necrotizing Fascitis    APPENDECTOMY  2010    CERVICAL SPINE SURGERY       SECTION       SECTION       SECTION       SECTION      CHOLECYSTECTOMY      COLONOSCOPY      Dr Chio Santos    CYSTOSCOPY W/ URETERAL STENT PLACEMENT Right 2017    Dr Arpit Duarte    CYSTOSCOPY W/ URETERAL STENT PLACEMENT  2017    Dr Arpit Duarte    DEBRIDEMENT, " ABDOMINAL WALL, FOR NECROTIZING SOFT TISSUE INFECTION      REFRACTIVE SURGERY      ROBOT-ASSISTED LAPAROSCOPIC PYELOPLASTY Right 2017    Dr Davonte Cohen    THORACIC SPINE SURGERY  2006    TONSILLECTOMY  1971    TOTAL ABDOMINAL HYSTERECTOMY W/ BILATERAL SALPINGOOPHORECTOMY  2002     Family History   Problem Relation Name Age of Onset    Alcohol abuse Mother      Parkinsonism Mother      Psychosis Mother      Hypertension Mother      Anxiety disorder Mother      Hyperlipidemia Mother      Leukemia Mother  67        Cause of death    Lymphoma Mother  67        Cause of death    Breast cancer Mother      Heart failure Mother      Alcohol abuse Father      Mitral valve prolapse Father      Anxiety disorder Father      Psychosis Father      Mitral valve prolapse Sister      Alcohol abuse Sister      Breast cancer Maternal Grandmother      Breast cancer Maternal Aunt      Breast cancer Maternal Aunt      Breast cancer Maternal Aunt       Social History     Tobacco Use    Smoking status: Former     Current packs/day: 0.00     Average packs/day: 0.5 packs/day for 8.0 years (4.0 ttl pk-yrs)     Types: Cigarettes     Start date:      Quit date:      Years since quittin.6    Smokeless tobacco: Never   Substance Use Topics    Alcohol use: Yes     Comment: occassional    Drug use: Never     Review of Systems   Constitutional: Negative.  Negative for activity change, appetite change, chills, diaphoresis, fatigue, fever and unexpected weight change.   HENT: Negative.  Negative for congestion, dental problem, drooling, ear discharge, ear pain, facial swelling, hearing loss, mouth sores, nosebleeds, postnasal drip, rhinorrhea, sinus pressure, sinus pain, sneezing, sore throat, tinnitus, trouble swallowing and voice change.    Eyes: Negative.  Negative for photophobia, pain, discharge, redness, itching and visual disturbance.   Respiratory: Negative.  Negative for apnea, cough, choking, chest tightness,  shortness of breath, wheezing and stridor.    Cardiovascular: Negative.  Negative for chest pain, palpitations and leg swelling.   Gastrointestinal: Negative.  Negative for abdominal distention, abdominal pain, anal bleeding, blood in stool, constipation, diarrhea, nausea, rectal pain and vomiting.   Endocrine: Negative.  Negative for cold intolerance, heat intolerance, polydipsia, polyphagia and polyuria.   Genitourinary: Negative.  Negative for decreased urine volume, difficulty urinating, dyspareunia, dysuria, enuresis, flank pain, frequency, genital sores, hematuria, menstrual problem, pelvic pain, urgency, vaginal bleeding, vaginal discharge and vaginal pain.   Musculoskeletal:  Positive for arthralgias and myalgias. Negative for back pain, gait problem, joint swelling, neck pain and neck stiffness.   Skin: Negative.  Negative for color change, pallor, rash and wound.   Allergic/Immunologic: Negative.  Negative for environmental allergies, food allergies and immunocompromised state.   Neurological: Negative.  Negative for dizziness, tremors, seizures, syncope, facial asymmetry, speech difficulty, weakness, light-headedness, numbness and headaches.   Hematological: Negative.  Negative for adenopathy. Does not bruise/bleed easily.   Psychiatric/Behavioral:  Negative for agitation, behavioral problems, confusion, decreased concentration, dysphoric mood, hallucinations, self-injury, sleep disturbance and suicidal ideas. The patient is nervous/anxious. The patient is not hyperactive.    All other systems reviewed and are negative.      Physical Exam     Initial Vitals [08/14/24 1916]   BP Pulse Resp Temp SpO2   126/72 108 20 98.4 °F (36.9 °C) 96 %      MAP       --         Physical Exam    Nursing note and vitals reviewed.  Constitutional: She appears well-developed and well-nourished. She is not diaphoretic. No distress.   HENT:   Head: Normocephalic and atraumatic.   Mouth/Throat: Oropharynx is clear and moist. No  oropharyngeal exudate.   Eyes: Conjunctivae and EOM are normal. Pupils are equal, round, and reactive to light. No scleral icterus.   Neck: Neck supple. No JVD present.   Normal range of motion.  Cardiovascular:  Normal rate, regular rhythm, normal heart sounds and intact distal pulses.     Exam reveals no gallop and no friction rub.       No murmur heard.  Pulmonary/Chest: Breath sounds normal. No respiratory distress. She has no wheezes. She exhibits no tenderness.   Abdominal: Abdomen is soft. Bowel sounds are normal. She exhibits no distension. There is no abdominal tenderness. There is no rebound.   Musculoskeletal:         General: Normal range of motion.      Cervical back: Normal range of motion and neck supple.     Neurological: She is alert and oriented to person, place, and time. She has normal strength.   Skin: Skin is warm and dry. Capillary refill takes less than 2 seconds.   Psychiatric: She has a normal mood and affect. Her behavior is normal. Judgment and thought content normal.         ED Course   Procedures    Admission on 08/14/2024   Component Date Value Ref Range Status    Sodium 08/14/2024 140  136 - 145 mmol/L Final    Potassium 08/14/2024 3.2 (L)  3.5 - 5.1 mmol/L Final    Chloride 08/14/2024 114 (H)  98 - 107 mmol/L Final    CO2 08/14/2024 17 (L)  22 - 29 mmol/L Final    Glucose 08/14/2024 95  74 - 100 mg/dL Final    Blood Urea Nitrogen 08/14/2024 8.0 (L)  9.8 - 20.1 mg/dL Final    Creatinine 08/14/2024 1.01  0.55 - 1.02 mg/dL Final    Calcium 08/14/2024 9.1  8.4 - 10.2 mg/dL Final    Protein Total 08/14/2024 6.7  6.4 - 8.3 gm/dL Final    Albumin 08/14/2024 3.5  3.5 - 5.0 g/dL Final    Globulin 08/14/2024 3.2  2.4 - 3.5 gm/dL Final    Albumin/Globulin Ratio 08/14/2024 1.1  1.1 - 2.0 ratio Final    Bilirubin Total 08/14/2024 0.2  <=1.5 mg/dL Final    ALP 08/14/2024 109  40 - 150 unit/L Final    ALT 08/14/2024 33  0 - 55 unit/L Final    AST 08/14/2024 20  5 - 34 unit/L Final    eGFR  08/14/2024 >60  mL/min/1.73/m2 Final    Anion Gap 08/14/2024 9.0  mEq/L Final    BUN/Creatinine Ratio 08/14/2024 8   Final    Color, UA 08/14/2024 Yellow  Yellow, Light-Yellow, Dark Yellow, Sharon, Straw Final    Appearance, UA 08/14/2024 Clear  Clear Final    Specific Gravity, UA 08/14/2024 <=1.005  1.005 - 1.030 Final    pH, UA 08/14/2024 6.0  5.0 - 8.5 Final    Protein, UA 08/14/2024 Negative  Negative Final    Glucose, UA 08/14/2024 Negative  Negative, Normal Final    Ketones, UA 08/14/2024 Negative  Negative Final    Blood, UA 08/14/2024 Trace-Intact (A)  Negative Final    Bilirubin, UA 08/14/2024 Negative  Negative Final    Urobilinogen, UA 08/14/2024 0.2  0.2, 1.0, Normal Final    Nitrites, UA 08/14/2024 Negative  Negative Final    Leukocyte Esterase, UA 08/14/2024 Negative  Negative Final    Creatine Kinase 08/14/2024 77  29 - 168 U/L Final    WBC 08/14/2024 7.16  4.50 - 11.50 x10(3)/mcL Final    RBC 08/14/2024 3.32 (L)  4.20 - 5.40 x10(6)/mcL Final    Hgb 08/14/2024 10.5 (L)  12.0 - 16.0 g/dL Final    Hct 08/14/2024 32.4 (L)  37.0 - 47.0 % Final    MCV 08/14/2024 97.6 (H)  80.0 - 94.0 fL Final    MCH 08/14/2024 31.6 (H)  27.0 - 31.0 pg Final    MCHC 08/14/2024 32.4 (L)  33.0 - 36.0 g/dL Final    RDW 08/14/2024 13.2  11.5 - 17.0 % Final    Platelet 08/14/2024 274  130 - 400 x10(3)/mcL Final    MPV 08/14/2024 9.1  7.4 - 10.4 fL Final    Neut % 08/14/2024 47.1  % Final    Lymph % 08/14/2024 42.0  % Final    Mono % 08/14/2024 6.3  % Final    Eos % 08/14/2024 3.5  % Final    Basophil % 08/14/2024 0.8  % Final    Lymph # 08/14/2024 3.01  0.6 - 4.6 x10(3)/mcL Final    Neut # 08/14/2024 3.37  2.1 - 9.2 x10(3)/mcL Final    Mono # 08/14/2024 0.45  0.1 - 1.3 x10(3)/mcL Final    Eos # 08/14/2024 0.25  0 - 0.9 x10(3)/mcL Final    Baso # 08/14/2024 0.06  <=0.2 x10(3)/mcL Final    IG# 08/14/2024 0.02  0 - 0.04 x10(3)/mcL Final    IG% 08/14/2024 0.3  % Final    Bacteria, UA 08/14/2024 None Seen  None Seen, Rare, Occasional  /HPF Final    RBC, UA 08/14/2024 0-2  None Seen, 0-2, 3-5, 0-5 /HPF Final    WBC, UA 08/14/2024 None Seen  None Seen, 0-2, 3-5, 0-5 /HPF Final    Squamous Epithelial Cells, UA 08/14/2024 Rare  None Seen, Rare, Occasional, Occ /HPF Final       Labs Reviewed   COMPREHENSIVE METABOLIC PANEL - Abnormal       Result Value    Sodium 140      Potassium 3.2 (*)     Chloride 114 (*)     CO2 17 (*)     Glucose 95      Blood Urea Nitrogen 8.0 (*)     Creatinine 1.01      Calcium 9.1      Protein Total 6.7      Albumin 3.5      Globulin 3.2      Albumin/Globulin Ratio 1.1      Bilirubin Total 0.2            ALT 33      AST 20      eGFR >60      Anion Gap 9.0      BUN/Creatinine Ratio 8     URINALYSIS, REFLEX TO URINE CULTURE - Abnormal    Color, UA Yellow      Appearance, UA Clear      Specific Gravity, UA <=1.005      pH, UA 6.0      Protein, UA Negative      Glucose, UA Negative      Ketones, UA Negative      Blood, UA Trace-Intact (*)     Bilirubin, UA Negative      Urobilinogen, UA 0.2      Nitrites, UA Negative      Leukocyte Esterase, UA Negative     CBC WITH DIFFERENTIAL - Abnormal    WBC 7.16      RBC 3.32 (*)     Hgb 10.5 (*)     Hct 32.4 (*)     MCV 97.6 (*)     MCH 31.6 (*)     MCHC 32.4 (*)     RDW 13.2      Platelet 274      MPV 9.1      Neut % 47.1      Lymph % 42.0      Mono % 6.3      Eos % 3.5      Basophil % 0.8      Lymph # 3.01      Neut # 3.37      Mono # 0.45      Eos # 0.25      Baso # 0.06      IG# 0.02      IG% 0.3     CK - Normal    Creatine Kinase 77     URINALYSIS, MICROSCOPIC - Normal    Bacteria, UA None Seen      RBC, UA 0-2      WBC, UA None Seen      Squamous Epithelial Cells, UA Rare     CBC W/ AUTO DIFFERENTIAL    Narrative:     The following orders were created for panel order CBC auto differential.  Procedure                               Abnormality         Status                     ---------                               -----------         ------                     CBC with  Differential[[id number]]       Abnormal            Final result                 Please view results for these tests on the individual orders.          Imaging Results    None          Medications   HYDROcodone-acetaminophen 5-325 mg per tablet 1 tablet (1 tablet Oral Given 8/14/24 1951)   potassium bicarbonate disintegrating tablet 40 mEq (40 mEq Oral Given 8/14/24 2032)     Medical Decision Making  54-year-old white female reports worsening thigh pain and can not keep her legs still in his unrelieved by taking her Lyrica.  Differential diagnosis includes but is not limited to lumbar radiculopathy, fibromyalgia, restless legs syndrome, electrolyte abnormality, urinary tract infection, peripheral arterial disease, venous reflux.  Workup included blood work and urinalysis.  Her blood work returned showing a moderate anemia and low potassium.  We discussed the findings and I have given her 40 mEq of potassium here and will send 7 days of potassium supplementation to the pharmacy.  In regards to her anemia she denies seeing any blood in her stool or urine.  Her MCV is minimally elevated relatively normal with a hemoglobin of 10.5 and we discussed following up with her primary care to get a full anemia workup but this is probably causing her to have a restless leg syndrome in the combination of anemia and hypokalemia.  She was given Norco and I have sent the script to the pharmacy for potassium for 7 days    Problems Addressed:  Anemia, unspecified type: undiagnosed new problem with uncertain prognosis  Hypokalemia: acute illness or injury with systemic symptoms  Restless leg: acute illness or injury    Amount and/or Complexity of Data Reviewed  Independent Historian: parent  External Data Reviewed: labs and notes.  Labs: ordered. Decision-making details documented in ED Course.    Risk  OTC drugs.  Prescription drug management.                                      Clinical Impression:  Final diagnoses:  [E87.6]  Hypokalemia (Primary)  [G25.81] Restless leg  [D64.9] Anemia, unspecified type          ED Disposition Condition    Discharge Stable          ED Prescriptions       Medication Sig Dispense Start Date End Date Auth. Provider    potassium chloride (KLOR-CON) 10 MEQ TbSR Take 1 tablet (10 mEq total) by mouth once daily. for 7 days 7 tablet 8/14/2024 8/21/2024 Matthew Nolasco MD          Follow-up Information       Follow up With Specialties Details Why Contact Info    Louise Monroy, [facility], Emergency Medicine Call in 1 day  [address] [place] [address]  [phone]            Marcy Fang, RNRegistered Nurse was present during the entire interaction     Matthew Nolasco MD  08/14/24 2055

## 2024-08-18 ENCOUNTER — HOSPITAL ENCOUNTER (EMERGENCY)
Facility: HOSPITAL | Age: 54
Discharge: HOME | End: 2024-08-18
Attending: EMERGENCY MEDICINE
Payer: MEDICAID

## 2024-08-18 VITALS
BODY MASS INDEX: 25.83 KG/M2 | DIASTOLIC BLOOD PRESSURE: 86 MMHG | TEMPERATURE: 98 F | RESPIRATION RATE: 18 BRPM | HEIGHT: 65 IN | SYSTOLIC BLOOD PRESSURE: 150 MMHG | OXYGEN SATURATION: 100 % | HEART RATE: 94 BPM | WEIGHT: 155 LBS

## 2024-08-18 DIAGNOSIS — M25.562 ACUTE PAIN OF LEFT KNEE: Primary | ICD-10-CM

## 2024-08-18 PROCEDURE — 99283 EMERGENCY DEPT VISIT LOW MDM: CPT | Mod: 25

## 2024-08-18 PROCEDURE — 96372 THER/PROPH/DIAG INJ SC/IM: CPT | Performed by: EMERGENCY MEDICINE

## 2024-08-18 PROCEDURE — 63600175 PHARM REV CODE 636 W HCPCS: Performed by: EMERGENCY MEDICINE

## 2024-08-18 RX ORDER — DEXAMETHASONE SODIUM PHOSPHATE 4 MG/ML
8 INJECTION, SOLUTION INTRA-ARTICULAR; INTRALESIONAL; INTRAMUSCULAR; INTRAVENOUS; SOFT TISSUE
Status: COMPLETED | OUTPATIENT
Start: 2024-08-18 | End: 2024-08-18

## 2024-08-18 RX ORDER — HYDROCODONE BITARTRATE AND ACETAMINOPHEN 5; 325 MG/1; MG/1
1 TABLET ORAL EVERY 6 HOURS PRN
Qty: 12 TABLET | Refills: 0 | Status: SHIPPED | OUTPATIENT
Start: 2024-08-18

## 2024-08-18 RX ADMIN — DEXAMETHASONE SODIUM PHOSPHATE 8 MG: 4 INJECTION, SOLUTION INTRA-ARTICULAR; INTRALESIONAL; INTRAMUSCULAR; INTRAVENOUS; SOFT TISSUE at 10:08

## 2024-08-18 NOTE — ED PROVIDER NOTES
"ED PROVIDER NOTE  2024    CHIEF COMPLAINT:   Chief Complaint   Patient presents with    Knee Pain     Pt c/o left knee pain, states she injured 4 weeks ago and thinks she aggravated injury yesterday.       HISTORY OF PRESENT ILLNESS:   Rosemarie Pretty is a 54 y.o. female who presents with chief complaint Knee pain.  Reports she injured her left knee last month and has been getting around on crutches.  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  States she would also like an orthopedic referral in his shot of steroid.  Denies any new trauma or injury.    The history is provided by the patient.         REVIEW OF SYSTEMS: as noted in the HPI.  NURSING NOTES REVIEWED      PAST MEDICAL/SURGICAL HISTORY:   Past Medical History:   Diagnosis Date    Abdominal hernia with obstruction and without gangrene     Anxiety disorder, unspecified     Bipolar depression     C. difficile colitis     Chronic anxiety     Degenerative disc disease, cervical     Depression     Elevated LFTs 2024    Family history of breast cancer in first degree relative     heavy maternal side history    Family history of systemic lupus erythematosus     Fibromyalgia     GERD (gastroesophageal reflux disease)     History of necrotizing fasciitis     HLD (hyperlipidemia)     Hydronephrosis     IBS (irritable bowel syndrome)     Insomnia     Migraines     Mild depression     Onychomycosis     Recurrent sinusitis     Renal cyst     Sigmoid diverticulosis     Tension-type headache     Thoracic degenerative disc disease     Traumatic injury of back       Past Surgical History:   Procedure Laterality Date    ABDOMINOPLASTY  2003    Reconstruction from Necrotizing Fascitis    APPENDECTOMY  2010    CERVICAL SPINE SURGERY  2004     SECTION       SECTION  2000     SECTION  1998     " "SECTION  1989    CHOLECYSTECTOMY      COLONOSCOPY      Dr Chio Santos    CYSTOSCOPY W/ URETERAL STENT PLACEMENT Right 2017    Dr Arpit Duarte    CYSTOSCOPY W/ URETERAL STENT PLACEMENT  2017    Dr Arpit Duarte    DEBRIDEMENT, ABDOMINAL WALL, FOR NECROTIZING SOFT TISSUE INFECTION  2002    REFRACTIVE SURGERY  1990    ROBOT-ASSISTED LAPAROSCOPIC PYELOPLASTY Right 2017    Dr Davonte Cohen    THORACIC SPINE SURGERY  2006    TONSILLECTOMY  1971    TOTAL ABDOMINAL HYSTERECTOMY W/ BILATERAL SALPINGOOPHORECTOMY         FAMILY HISTORY:   Family History   Problem Relation Name Age of Onset    Alcohol abuse Mother      Parkinsonism Mother      Psychosis Mother      Hypertension Mother      Anxiety disorder Mother      Hyperlipidemia Mother      Leukemia Mother  67        Cause of death    Lymphoma Mother  67        Cause of death    Breast cancer Mother      Heart failure Mother      Alcohol abuse Father      Mitral valve prolapse Father      Anxiety disorder Father      Psychosis Father      Mitral valve prolapse Sister      Alcohol abuse Sister      Breast cancer Maternal Grandmother      Breast cancer Maternal Aunt      Breast cancer Maternal Aunt      Breast cancer Maternal Aunt         SOCIAL HISTORY:   Social History     Tobacco Use    Smoking status: Former     Current packs/day: 0.00     Average packs/day: 0.5 packs/day for 8.0 years (4.0 ttl pk-yrs)     Types: Cigarettes     Start date:      Quit date:      Years since quittin.6    Smokeless tobacco: Never   Substance Use Topics    Alcohol use: Yes     Comment: occassional    Drug use: Never       ALLERGIES:   Review of patient's allergies indicates:   Allergen Reactions    Latex Other (See Comments)     Other reaction(s): Blisters/Breaks down skin  "It eats through my skin"         PHYSICAL EXAM:  Initial Vitals [24 1027]   BP Pulse Resp Temp SpO2   (!) 155/87 99 18 98.4 °F (36.9 °C) 98 %      MAP       --     "     Physical Exam    Nursing note and vitals reviewed.  Constitutional: She appears well-developed and well-nourished.   HENT:   Head: Normocephalic and atraumatic.   Mouth/Throat: Uvula is midline and mucous membranes are normal.   Eyes: EOM are normal. Pupils are equal, round, and reactive to light.   Neck: Trachea normal. Neck supple.   Cardiovascular:  Normal rate, regular rhythm and normal pulses.           Pulmonary/Chest: Effort normal and breath sounds normal.   Abdominal: Abdomen is soft. Bowel sounds are normal. There is no rebound and no guarding.   Musculoskeletal:         General: Normal range of motion.      Cervical back: Neck supple.      Left knee: No deformity. Tenderness present over the medial joint line. No LCL laxity, MCL laxity, ACL laxity or PCL laxity.    Neurological: She is alert and oriented to person, place, and time. GCS eye subscore is 4. GCS verbal subscore is 5. GCS motor subscore is 6.   Skin: Skin is warm and dry.   Psychiatric: She has a normal mood and affect. Her speech is normal. Thought content normal.         RESULTS:  Labs Reviewed - No data to display  Imaging Results    None         PROCEDURES:  Procedures    ECG:       ED COURSE AND MEDICAL DECISION MAKING:  Medications   dexAMETHasone injection 8 mg (8 mg Intramuscular Given 8/18/24 1045)           Medical Decision Making  54-year-old female who presents with left knee pain she feels she aggravated yesterday while at work, stating she injured it a month ago and has been getting around with the crutches.  Knee has no evidence of any ligamentous instability.  Review of medical record shows that she had x-rays performed after she injured it up last month and this was unremarkable, no need to repeat x-rays today.  We will provide short course of hydrocodone for her pain along with a steroid injection, given orthopedic referral.  Given strict ED return precautions. I have spoken with the patient and/or caregivers. I have  explained the patient's condition, diagnoses and treatment plan based on the information available to me at this time. I have answered the patient's and/or caregiver's questions and addressed any concerns. The patient and/or caregivers have as good an understanding of the patient's diagnosis, condition and treatment plan as can be expected at this point. The vital signs have been stable. The patient's condition is stable and appropriate for discharge from the emergency department.     The patient will pursue further outpatient evaluation with the primary care physician or other designated or consulting physician as outlined in the discharge instructions. The patient and/or caregivers are agreeable to this plan of care and follow-up instructions have been explained in detail. The patient and/or caregivers have received these instructions in written format and have expressed an understanding of the discharge instructions. The patient and/or caregivers are aware that any significant change in condition or worsening of symptoms should prompt an immediate return to this or the closest emergency department or a call to 911.    Amount and/or Complexity of Data Reviewed  External Data Reviewed: radiology and notes.     Details: Impression      No acute osseous abnormality.  Narrative    XR KNEE 3 VIEW LEFT    HISTORY: knee injury    COMPARISON:  None.    FINDINGS:    No acute displaced fractures or dislocations.  Joint spaces preserved.  No blastic or lytic lesions.  Soft tissues within normal limits.  No evident suprapatellar effusion.  Exam End: 07/29/24 19:25 Last Resulted: 07/30/24 09:04  [facility]  Result Received: 08/14/24 19:12        Risk  OTC drugs.  Prescription drug management.        CLINICAL IMPRESSION:  1. Acute pain of left knee        DISPOSITION:   ED Disposition Condition    Discharge Stable            ED  Prescriptions       Medication Sig Dispense Start Date End Date Auth. Provider    HYDROcodone-acetaminophen (NORCO) 5-325 mg per tablet Take 1 tablet by mouth every 6 (six) hours as needed for Pain. 12 tablet 8/18/2024 -- Anthony Duarte DO          Follow-up Information       Follow up With Specialties Details Why Contact Info    Louise Monroy, P [facility], Emergency Medicine Schedule an appointment as soon as possible for a visit   [address] [place] [address]  [phone]      [facility] - Emergency Dept Emergency Medicine  If symptoms worsen [address] [facility] [address]  [phone]               Anthony Duarte DO  08/18/24 2704

## 2024-11-11 ENCOUNTER — HOSPITAL ENCOUNTER (OUTPATIENT)
Dept: RADIOLOGY | Facility: HOSPITAL | Age: 54
Discharge: HOME | End: 2024-11-11
Attending: NURSE PRACTITIONER
Payer: MEDICAID

## 2024-11-11 DIAGNOSIS — R52 PAIN: ICD-10-CM

## 2024-11-11 PROCEDURE — 74019 RADEX ABDOMEN 2 VIEWS: CPT | Mod: TC

## 2024-12-17 ENCOUNTER — HOSPITAL ENCOUNTER (EMERGENCY)
Facility: HOSPITAL | Age: 54
Discharge: HOME | End: 2024-12-17
Attending: INTERNAL MEDICINE
Payer: MEDICAID

## 2024-12-17 VITALS
HEIGHT: 65 IN | DIASTOLIC BLOOD PRESSURE: 78 MMHG | TEMPERATURE: 98 F | RESPIRATION RATE: 18 BRPM | SYSTOLIC BLOOD PRESSURE: 122 MMHG | BODY MASS INDEX: 24.63 KG/M2 | OXYGEN SATURATION: 98 % | WEIGHT: 147.81 LBS | HEART RATE: 99 BPM

## 2024-12-17 DIAGNOSIS — B33.8 RSV (RESPIRATORY SYNCYTIAL VIRUS INFECTION): Primary | ICD-10-CM

## 2024-12-17 LAB
FLUAV AG UPPER RESP QL IA.RAPID: NOT DETECTED
FLUBV AG UPPER RESP QL IA.RAPID: NOT DETECTED
RSV A 5' UTR RNA NPH QL NAA+PROBE: DETECTED
SARS-COV-2 RNA RESP QL NAA+PROBE: NOT DETECTED
STREP A PCR (OHS): NOT DETECTED

## 2024-12-17 PROCEDURE — 0241U COVID/RSV/FLU A&B PCR: CPT | Performed by: NURSE PRACTITIONER

## 2024-12-17 PROCEDURE — 99284 EMERGENCY DEPT VISIT MOD MDM: CPT

## 2024-12-17 PROCEDURE — 87651 STREP A DNA AMP PROBE: CPT | Performed by: NURSE PRACTITIONER

## 2024-12-17 RX ORDER — ALBUTEROL SULFATE 90 UG/1
2 INHALANT RESPIRATORY (INHALATION) EVERY 4 HOURS PRN
Qty: 18 G | Refills: 0 | Status: SHIPPED | OUTPATIENT
Start: 2024-12-17 | End: 2024-12-31

## 2024-12-17 RX ORDER — BENZONATATE 100 MG/1
100 CAPSULE ORAL 3 TIMES DAILY PRN
Qty: 20 CAPSULE | Refills: 0 | Status: SHIPPED | OUTPATIENT
Start: 2024-12-17 | End: 2024-12-27

## 2024-12-18 NOTE — ED PROVIDER NOTES
"Encounter Date: 2024       History     Chief Complaint   Patient presents with    Cough     Pt c/o cough, sore throat, headache, body aches, and chest congestion. Exposure to RSV.      See MDM    The history is provided by the patient. No  was used.     Review of patient's allergies indicates:   Allergen Reactions    Latex Other (See Comments)     Other reaction(s): Blisters/Breaks down skin  "It eats through my skin"       Past Medical History:   Diagnosis Date    Abdominal hernia with obstruction and without gangrene     Anxiety disorder, unspecified     Bipolar depression     C. difficile colitis     Chronic anxiety     Degenerative disc disease, cervical     Depression     Elevated LFTs 2024    Family history of breast cancer in first degree relative     heavy maternal side history    Family history of systemic lupus erythematosus     Fibromyalgia     GERD (gastroesophageal reflux disease)     History of necrotizing fasciitis     HLD (hyperlipidemia)     Hydronephrosis     IBS (irritable bowel syndrome)     Insomnia     Migraines     Mild depression     Onychomycosis     Recurrent sinusitis     Renal cyst     Sigmoid diverticulosis     Tension-type headache     Thoracic degenerative disc disease     Traumatic injury of back      Past Surgical History:   Procedure Laterality Date    ABDOMINOPLASTY  2003    Reconstruction from Necrotizing Fascitis    APPENDECTOMY  2010    CERVICAL SPINE SURGERY       SECTION       SECTION       SECTION       SECTION      CHOLECYSTECTOMY      COLONOSCOPY      Dr Chio Santos    CYSTOSCOPY W/ URETERAL STENT PLACEMENT Right 2017    Dr Arpit Duarte    CYSTOSCOPY W/ URETERAL STENT PLACEMENT  2017    Dr Arpit Duarte    DEBRIDEMENT, ABDOMINAL WALL, FOR NECROTIZING SOFT TISSUE INFECTION      REFRACTIVE SURGERY      ROBOT-ASSISTED LAPAROSCOPIC PYELOPLASTY Right 2017    " Dr Davonte Cohen    THORACIC SPINE SURGERY  2006    TONSILLECTOMY  1971    TOTAL ABDOMINAL HYSTERECTOMY W/ BILATERAL SALPINGOOPHORECTOMY  2002     Family History   Problem Relation Name Age of Onset    Alcohol abuse Mother      Parkinsonism Mother      Psychosis Mother      Hypertension Mother      Anxiety disorder Mother      Hyperlipidemia Mother      Leukemia Mother  67        Cause of death    Lymphoma Mother  67        Cause of death    Breast cancer Mother      Heart failure Mother      Alcohol abuse Father      Mitral valve prolapse Father      Anxiety disorder Father      Psychosis Father      Mitral valve prolapse Sister      Alcohol abuse Sister      Breast cancer Maternal Grandmother      Breast cancer Maternal Aunt      Breast cancer Maternal Aunt      Breast cancer Maternal Aunt       Social History     Tobacco Use    Smoking status: Every Day     Current packs/day: 0.50     Average packs/day: 0.5 packs/day for 11.0 years (5.5 ttl pk-yrs)     Types: Cigarettes     Start date: 2014    Smokeless tobacco: Never   Substance Use Topics    Alcohol use: Yes     Comment: occassional    Drug use: Never     Review of Systems   Constitutional:  Negative for fever.   HENT:  Positive for congestion.    Respiratory:  Positive for cough. Negative for shortness of breath.    Cardiovascular:  Negative for chest pain.   Gastrointestinal:  Negative for abdominal pain.   Genitourinary:  Negative for difficulty urinating and dysuria.   Musculoskeletal:  Negative for gait problem.   Skin:  Negative for color change.   Neurological:  Negative for dizziness, speech difficulty and headaches.   Psychiatric/Behavioral:  Negative for hallucinations and suicidal ideas.    All other systems reviewed and are negative.      Physical Exam     Initial Vitals [12/17/24 1932]   BP Pulse Resp Temp SpO2   118/77 102 20 98.3 °F (36.8 °C) 98 %      MAP       --         Physical Exam    Nursing note and vitals reviewed.  Constitutional: She  appears well-developed and well-nourished.   HENT:   Head: Normocephalic.   Eyes: EOM are normal.   Neck:   Normal range of motion.  Cardiovascular:  Normal rate, regular rhythm, normal heart sounds and intact distal pulses.           Pulmonary/Chest: Breath sounds normal. No respiratory distress.   Abdominal: Abdomen is soft. Bowel sounds are normal. There is no abdominal tenderness.   Musculoskeletal:         General: Normal range of motion.      Cervical back: Normal range of motion.     Neurological: She is alert and oriented to person, place, and time. She has normal strength.   Skin: Skin is warm and dry.   Psychiatric: She has a normal mood and affect. Her behavior is normal. Judgment and thought content normal.         ED Course   Procedures  Labs Reviewed   COVID/RSV/FLU A&B PCR - Abnormal       Result Value    Influenza A PCR Not Detected      Influenza B PCR Not Detected      Respiratory Syncytial Virus PCR Detected (*)     SARS-CoV-2 PCR Not Detected      Narrative:     The Xpert Xpress SARS-CoV-2/FLU/RSV plus is a rapid, multiplexed real-time PCR test intended for the simultaneous qualitative detection and differentiation of SARS-CoV-2, Influenza A, Influenza B, and respiratory syncytial virus (RSV) viral RNA in either nasopharyngeal swab or nasal swab specimens.         STREP GROUP A BY PCR - Normal    STREP A PCR ([facility]) Not Detected      Narrative:     The Xpert Xpress Strep A test is a rapid, qualitative in vitro diagnostic test for the detection of Streptococcus pyogenes (Group A ß-hemolytic Streptococcus, Strep A) in throat swab specimens from patients with signs and symptoms of pharyngitis.            Imaging Results    None          Medications - No data to display  Medical Decision Making  Historian:  Patient.  Patient is a White 54 y.o. female that presents with cough and sore throat that has been present few days. Associated symptoms nothing. Surrounding information is exposed to RSV.  Exacerbated by nothing. Relieved by nothing. Patient treatment prior to arrival none. Risk factors include none. Other history pertaining to this complaint nothing.   Assessment:  See physical exam.  DD:  COVID, flu, RSV, strep  ED Course: History was obtained.  Physical was performed.  Patient appears to have RSV.  O2 saturation was 98%. Medical or surgical consults:  None. Social determinants that affect healthcare:  None.       Amount and/or Complexity of Data Reviewed  Labs:      Details: Positive RSV    Risk  Prescription drug management.  Risk Details: Albuterol and Tessalon                                      Clinical Impression:  Final diagnoses:  [B33.8] RSV (respiratory syncytial virus infection) (Primary)          ED Disposition Condition    Discharge Stable          ED Prescriptions       Medication Sig Dispense Start Date End Date Auth. Provider    benzonatate (TESSALON) 100 MG capsule Take 1 capsule (100 mg total) by mouth 3 (three) times daily as needed for Cough. 20 capsule 12/17/2024 12/27/2024 Yuriy Ayon FNP    albuterol (PROVENTIL HFA) 90 mcg/actuation inhaler Inhale 2 puffs into the lungs every 4 (four) hours as needed for Wheezing. Rescue 18 g 12/17/2024 12/31/2024 Yuriy Ayon FNP          Follow-up Information       Follow up With Specialties Details Why Contact Info    Your Primary Care Provider  Call in 3 days ed follow up              Yuriy Ayon FNP  12/17/24 2047

## 2024-12-19 ENCOUNTER — HOSPITAL ENCOUNTER (OUTPATIENT)
Dept: RADIOLOGY | Facility: HOSPITAL | Age: 54
Discharge: HOME | End: 2024-12-19
Attending: NURSE PRACTITIONER
Payer: MEDICAID

## 2024-12-19 DIAGNOSIS — B97.4 RESPIRATORY SYNCYTIAL VIRUS AS THE CAUSE OF DISEASES CLASSIFIED ELSEWHERE: ICD-10-CM

## 2024-12-19 PROCEDURE — 71046 X-RAY EXAM CHEST 2 VIEWS: CPT | Mod: TC

## 2025-01-15 ENCOUNTER — HOSPITAL ENCOUNTER (EMERGENCY)
Facility: HOSPITAL | Age: 55
Discharge: HOME | End: 2025-01-15
Attending: INTERNAL MEDICINE
Payer: MEDICAID

## 2025-01-15 VITALS
TEMPERATURE: 98 F | HEIGHT: 65 IN | DIASTOLIC BLOOD PRESSURE: 73 MMHG | HEART RATE: 83 BPM | OXYGEN SATURATION: 99 % | RESPIRATION RATE: 17 BRPM | WEIGHT: 142 LBS | SYSTOLIC BLOOD PRESSURE: 128 MMHG | BODY MASS INDEX: 23.66 KG/M2

## 2025-01-15 DIAGNOSIS — M25.551 RIGHT HIP PAIN: Primary | ICD-10-CM

## 2025-01-15 PROCEDURE — 25000003 PHARM REV CODE 250

## 2025-01-15 PROCEDURE — 99283 EMERGENCY DEPT VISIT LOW MDM: CPT | Mod: 25

## 2025-01-15 RX ORDER — ONDANSETRON 4 MG/1
4 TABLET, ORALLY DISINTEGRATING ORAL
Status: COMPLETED | OUTPATIENT
Start: 2025-01-15 | End: 2025-01-15

## 2025-01-15 RX ORDER — HYDROCODONE BITARTRATE AND ACETAMINOPHEN 5; 325 MG/1; MG/1
1 TABLET ORAL EVERY 6 HOURS PRN
Qty: 20 TABLET | Refills: 0 | Status: SHIPPED | OUTPATIENT
Start: 2025-01-15 | End: 2025-01-20

## 2025-01-15 RX ORDER — HYDROCODONE BITARTRATE AND ACETAMINOPHEN 10; 325 MG/1; MG/1
1 TABLET ORAL
Status: COMPLETED | OUTPATIENT
Start: 2025-01-15 | End: 2025-01-15

## 2025-01-15 RX ADMIN — HYDROCODONE BITARTRATE AND ACETAMINOPHEN 1 TABLET: 10; 325 TABLET ORAL at 05:01

## 2025-01-15 RX ADMIN — ONDANSETRON 4 MG: 4 TABLET, ORALLY DISINTEGRATING ORAL at 05:01

## 2025-01-16 NOTE — DISCHARGE INSTRUCTIONS
Take Tylenol or ibuprofen as needed for your pain.  For more severe pain taking Norco.  Apply warm compresses to the affected area to help with pain.  Follow up with your primary care doctor.  If you experience any new or worsening symptoms return to the emergency department.

## 2025-01-16 NOTE — ED PROVIDER NOTES
"Encounter Date: 1/15/2025       History     Chief Complaint   Patient presents with    Hip Pain     R hip and leg pain    started 2-3 weeks ago but has worsenned a lot today     See MDM    The history is provided by the patient. No  was used.     Review of patient's allergies indicates:   Allergen Reactions    Latex Other (See Comments)     Other reaction(s): Blisters/Breaks down skin  "It eats through my skin"       Past Medical History:   Diagnosis Date    Abdominal hernia with obstruction and without gangrene     Anxiety disorder, unspecified     Bipolar depression     C. difficile colitis     Chronic anxiety     Degenerative disc disease, cervical     Depression     Elevated LFTs 2024    Family history of breast cancer in first degree relative     heavy maternal side history    Family history of systemic lupus erythematosus     Fibromyalgia     GERD (gastroesophageal reflux disease)     History of necrotizing fasciitis     HLD (hyperlipidemia)     Hydronephrosis     IBS (irritable bowel syndrome)     Insomnia     Migraines     Mild depression     Onychomycosis     Recurrent sinusitis     Renal cyst     Sigmoid diverticulosis     Tension-type headache     Thoracic degenerative disc disease     Traumatic injury of back      Past Surgical History:   Procedure Laterality Date    ABDOMINOPLASTY  2003    Reconstruction from Necrotizing Fascitis    APPENDECTOMY  2010    CERVICAL SPINE SURGERY       SECTION       SECTION       SECTION       SECTION      CHOLECYSTECTOMY      COLONOSCOPY      Dr Chio Santos    CYSTOSCOPY W/ URETERAL STENT PLACEMENT Right 2017    Dr Arpit Duarte    CYSTOSCOPY W/ URETERAL STENT PLACEMENT  2017    Dr Arpit Duarte    DEBRIDEMENT, ABDOMINAL WALL, FOR NECROTIZING SOFT TISSUE INFECTION      REFRACTIVE SURGERY      ROBOT-ASSISTED LAPAROSCOPIC PYELOPLASTY Right 2017    Dr Arauz " Vicki    THORACIC SPINE SURGERY  2006    TONSILLECTOMY  1971    TOTAL ABDOMINAL HYSTERECTOMY W/ BILATERAL SALPINGOOPHORECTOMY  2002     Family History   Problem Relation Name Age of Onset    Alcohol abuse Mother      Parkinsonism Mother      Psychosis Mother      Hypertension Mother      Anxiety disorder Mother      Hyperlipidemia Mother      Leukemia Mother  67        Cause of death    Lymphoma Mother  67        Cause of death    Breast cancer Mother      Heart failure Mother      Alcohol abuse Father      Mitral valve prolapse Father      Anxiety disorder Father      Psychosis Father      Mitral valve prolapse Sister      Alcohol abuse Sister      Breast cancer Maternal Grandmother      Breast cancer Maternal Aunt      Breast cancer Maternal Aunt      Breast cancer Maternal Aunt       Social History     Tobacco Use    Smoking status: Every Day     Current packs/day: 0.50     Average packs/day: 0.5 packs/day for 11.0 years (5.5 ttl pk-yrs)     Types: Cigarettes     Start date: 2014    Smokeless tobacco: Never   Substance Use Topics    Alcohol use: Yes     Comment: occassional    Drug use: Never     Review of Systems   Musculoskeletal:         Right hip pain   All other systems reviewed and are negative.      Physical Exam     Initial Vitals [01/15/25 1642]   BP Pulse Resp Temp SpO2   118/68 78 16 98 °F (36.7 °C) 100 %      MAP       --         Physical Exam    Nursing note and vitals reviewed.  Constitutional: She appears well-developed and well-nourished.   HENT:   Head: Normocephalic and atraumatic.   Eyes: EOM are normal.   Neck:   Normal range of motion.  Cardiovascular:  Normal rate, regular rhythm and intact distal pulses.           Pulmonary/Chest: Breath sounds normal.   Musculoskeletal:      Cervical back: Normal range of motion.      Right hip: Tenderness and bony tenderness present. Decreased range of motion (Due to pain).        Legs:       Comments: TTP to the right anterior and lateral hip.  All other  adjacent joints otherwise normal  Patient is ambulatory with antalgic gait.     Neurological: She is alert and oriented to person, place, and time. No sensory deficit.   Skin: Skin is warm and dry. Capillary refill takes less than 2 seconds.   Psychiatric: She has a normal mood and affect.         ED Course   Procedures  Labs Reviewed - No data to display       Imaging Results              X-Ray Hip 2 or 3 views Right with Pelvis when performed (Final result)  Result time 01/15/25 17:49:27      Final result by Fernando Stroud MD (01/15/25 17:49:27)                   Impression:      No acute osseous finding.  Mild right hip DJD.      Electronically signed by: Fernando Stroud MD  Date:    01/15/2025  Time:    17:49               Narrative:    EXAMINATION:  Pelvis one view, right hip two views    CLINICAL HISTORY:  Right hip pain    COMPARISON:  06/25/2024    FINDINGS:  There is no fracture subluxation.  There are mild degenerative changes of the right hip joint.  No erosion or osseous destruction seen.                                       Medications   HYDROcodone-acetaminophen  mg per tablet 1 tablet (1 tablet Oral Given 1/15/25 1743)   ondansetron disintegrating tablet 4 mg (4 mg Oral Given 1/15/25 1743)     Medical Decision Making  The patient is a 54 y.o. female with a pertinent PMHX of chronic back pain who presents to the Emergency Department with a chief complaint of right hip pain. Symptoms began 2-3 weeks ago and have been constant since onset and worsening since today. Associated symptoms include nothing. The hip pain is currently rated as a 7/10 in severity and described as burning with radiation up and down her leg.  Symptoms are aggravated with walking and alleviated with nothing. The patient denies numbness, tingling, fever, injury/trauma, chest pain, shortness of breath, saddle anesthesia, incontinence, nausea, vomiting, dysuria, hematuria, rash, open wounds. They report taking Tylenol, Advil  "prior to arrival with no relief of symptoms. No other reported symptoms at this time.    Pertinent physical exam findings include TTP to the right anterior and lateral hip.  Vital signs stable.  Patient given Norco while in the ER for her pain.    Right hip XR Impression:No acute osseous finding.  Mild right hip DJD.    Imaging findings discussed with patient.  Patient states improvement in her symptoms with pain medication.  Advised PCP follow-up for further imaging or referrals as needed.  Medication for pain sent to pharmacy.  Patient states that she would like to hold off on steroid injection at this time.  Discharge instructions and return precautions provided. Patient verbalized understanding and agreement of plan. All questions answered.    Portions of this note have been created with voice recognition software. Occasional "wrong-words" or "sound alike" substitutions may have occurred due to inherent limitations of voice software. Please read the note carefully and recognize, using context, word substitutions may have occurred.       Amount and/or Complexity of Data Reviewed  Radiology: ordered. Decision-making details documented in ED Course.    Risk  Prescription drug management.      Additional MDM:   Differential Diagnosis:   Other: The following diagnoses were also considered and will be evaluated: Hip fracture, Arthritis and Muscle strain.                                   Clinical Impression:  Final diagnoses:  [M25.551] Right hip pain (Primary)          ED Disposition Condition    Discharge Stable          ED Prescriptions       Medication Sig Dispense Start Date End Date Auth. Provider    HYDROcodone-acetaminophen (NORCO) 5-325 mg per tablet Take 1 tablet by mouth every 6 (six) hours as needed for Pain. 20 tablet 1/15/2025 1/20/2025 Peggy Macedo PA-C          Follow-up Information       Follow up With Specialties Details Why Contact Info    Louise Monroy, [facility] " Medicine Call in 1 week As needed for follow up [address] [place] [address]  [phone]               Peggy Macedo PA-C  01/15/25 2017

## 2025-02-28 ENCOUNTER — HOSPITAL ENCOUNTER (EMERGENCY)
Facility: HOSPITAL | Age: 55
Discharge: HOME | End: 2025-02-28
Attending: EMERGENCY MEDICINE
Payer: MEDICAID

## 2025-02-28 VITALS
TEMPERATURE: 98 F | DIASTOLIC BLOOD PRESSURE: 88 MMHG | OXYGEN SATURATION: 99 % | WEIGHT: 145.19 LBS | RESPIRATION RATE: 19 BRPM | BODY MASS INDEX: 24.19 KG/M2 | HEIGHT: 65 IN | HEART RATE: 78 BPM | SYSTOLIC BLOOD PRESSURE: 128 MMHG

## 2025-02-28 DIAGNOSIS — R33.9 URINARY RETENTION: Primary | ICD-10-CM

## 2025-02-28 LAB
ALBUMIN SERPL-MCNC: 4 G/DL (ref 3.5–5)
ALBUMIN/GLOB SERPL: 1.1 RATIO (ref 1.1–2)
ALP SERPL-CCNC: 96 UNIT/L (ref 40–150)
ALT SERPL-CCNC: 21 UNIT/L (ref 0–55)
ANION GAP SERPL CALC-SCNC: 8 MEQ/L
AST SERPL-CCNC: 18 UNIT/L (ref 5–34)
BACTERIA #/AREA URNS AUTO: ABNORMAL /HPF
BASOPHILS # BLD AUTO: 0.02 X10(3)/MCL
BASOPHILS NFR BLD AUTO: 0.3 %
BILIRUB SERPL-MCNC: 0.2 MG/DL
BILIRUB UR QL STRIP.AUTO: NEGATIVE
BUN SERPL-MCNC: 13 MG/DL (ref 9.8–20.1)
CALCIUM SERPL-MCNC: 8.9 MG/DL (ref 8.4–10.2)
CHLORIDE SERPL-SCNC: 114 MMOL/L (ref 98–107)
CLARITY UR: CLEAR
CO2 SERPL-SCNC: 19 MMOL/L (ref 22–29)
COLOR UR AUTO: YELLOW
CREAT SERPL-MCNC: 0.99 MG/DL (ref 0.55–1.02)
CREAT/UREA NIT SERPL: 13
EOSINOPHIL # BLD AUTO: 0.06 X10(3)/MCL (ref 0–0.9)
EOSINOPHIL NFR BLD AUTO: 0.8 %
ERYTHROCYTE [DISTWIDTH] IN BLOOD BY AUTOMATED COUNT: 13.1 % (ref 11.5–17)
GFR SERPLBLD CREATININE-BSD FMLA CKD-EPI: >60 ML/MIN/1.73/M2
GLOBULIN SER-MCNC: 3.6 GM/DL (ref 2.4–3.5)
GLUCOSE SERPL-MCNC: 109 MG/DL (ref 74–100)
GLUCOSE UR QL STRIP: NEGATIVE
HCT VFR BLD AUTO: 36.3 % (ref 37–47)
HGB BLD-MCNC: 11.9 G/DL (ref 12–16)
HGB UR QL STRIP: ABNORMAL
IMM GRANULOCYTES # BLD AUTO: 0.02 X10(3)/MCL (ref 0–0.04)
IMM GRANULOCYTES NFR BLD AUTO: 0.3 %
KETONES UR QL STRIP: NEGATIVE
LEUKOCYTE ESTERASE UR QL STRIP: NEGATIVE
LYMPHOCYTES # BLD AUTO: 2.29 X10(3)/MCL (ref 0.6–4.6)
LYMPHOCYTES NFR BLD AUTO: 29 %
MCH RBC QN AUTO: 31.2 PG (ref 27–31)
MCHC RBC AUTO-ENTMCNC: 32.8 G/DL (ref 33–36)
MCV RBC AUTO: 95.3 FL (ref 80–94)
MONOCYTES # BLD AUTO: 0.43 X10(3)/MCL (ref 0.1–1.3)
MONOCYTES NFR BLD AUTO: 5.4 %
MUCOUS THREADS URNS QL MICRO: ABNORMAL /LPF
NEUTROPHILS # BLD AUTO: 5.07 X10(3)/MCL (ref 2.1–9.2)
NEUTROPHILS NFR BLD AUTO: 64.2 %
NITRITE UR QL STRIP: NEGATIVE
PH UR STRIP: 6 [PH]
PLATELET # BLD AUTO: 262 X10(3)/MCL (ref 130–400)
PMV BLD AUTO: 9.2 FL (ref 7.4–10.4)
POTASSIUM SERPL-SCNC: 3.2 MMOL/L (ref 3.5–5.1)
PROT SERPL-MCNC: 7.6 GM/DL (ref 6.4–8.3)
PROT UR QL STRIP: NEGATIVE
RBC # BLD AUTO: 3.81 X10(6)/MCL (ref 4.2–5.4)
RBC #/AREA URNS AUTO: ABNORMAL /HPF
SODIUM SERPL-SCNC: 141 MMOL/L (ref 136–145)
SP GR UR STRIP.AUTO: 1.02 (ref 1–1.03)
SQUAMOUS #/AREA URNS AUTO: ABNORMAL /HPF
UROBILINOGEN UR STRIP-ACNC: 0.2
WBC # BLD AUTO: 7.89 X10(3)/MCL (ref 4.5–11.5)
WBC #/AREA URNS AUTO: ABNORMAL /HPF

## 2025-02-28 PROCEDURE — 51702 INSERT TEMP BLADDER CATH: CPT

## 2025-02-28 PROCEDURE — 96374 THER/PROPH/DIAG INJ IV PUSH: CPT

## 2025-02-28 PROCEDURE — 80053 COMPREHEN METABOLIC PANEL: CPT

## 2025-02-28 PROCEDURE — 63600175 PHARM REV CODE 636 W HCPCS

## 2025-02-28 PROCEDURE — 99285 EMERGENCY DEPT VISIT HI MDM: CPT | Mod: 25

## 2025-02-28 PROCEDURE — 81003 URINALYSIS AUTO W/O SCOPE: CPT

## 2025-02-28 PROCEDURE — 85025 COMPLETE CBC W/AUTO DIFF WBC: CPT

## 2025-02-28 PROCEDURE — 96372 THER/PROPH/DIAG INJ SC/IM: CPT | Performed by: EMERGENCY MEDICINE

## 2025-02-28 PROCEDURE — 25500020 PHARM REV CODE 255

## 2025-02-28 PROCEDURE — 63600175 PHARM REV CODE 636 W HCPCS: Mod: JZ,TB | Performed by: EMERGENCY MEDICINE

## 2025-02-28 RX ORDER — KETOROLAC TROMETHAMINE 30 MG/ML
30 INJECTION, SOLUTION INTRAMUSCULAR; INTRAVENOUS
Status: COMPLETED | OUTPATIENT
Start: 2025-02-28 | End: 2025-02-28

## 2025-02-28 RX ORDER — IOPAMIDOL 755 MG/ML
100 INJECTION, SOLUTION INTRAVASCULAR
Status: COMPLETED | OUTPATIENT
Start: 2025-02-28 | End: 2025-02-28

## 2025-02-28 RX ORDER — ONDANSETRON HYDROCHLORIDE 2 MG/ML
4 INJECTION, SOLUTION INTRAVENOUS
Status: COMPLETED | OUTPATIENT
Start: 2025-02-28 | End: 2025-02-28

## 2025-02-28 RX ADMIN — IOPAMIDOL 100 ML: 755 INJECTION, SOLUTION INTRAVENOUS at 10:02

## 2025-02-28 RX ADMIN — KETOROLAC TROMETHAMINE 30 MG: 30 INJECTION, SOLUTION INTRAMUSCULAR at 11:02

## 2025-02-28 RX ADMIN — ONDANSETRON 4 MG: 2 INJECTION INTRAMUSCULAR; INTRAVENOUS at 09:02

## 2025-03-01 NOTE — ED PROVIDER NOTES
"Encounter Date: 2025       History     Chief Complaint   Patient presents with    Urinary Retention     Pt states she has not been able to urinate since 3 oclock this morning.     See MDM.     The history is provided by the patient. No  was used.     Review of patient's allergies indicates:   Allergen Reactions    Latex Other (See Comments)     Other reaction(s): Blisters/Breaks down skin  "It eats through my skin"       Past Medical History:   Diagnosis Date    Abdominal hernia with obstruction and without gangrene     Anxiety disorder, unspecified     Bipolar depression     C. difficile colitis     Chronic anxiety     Degenerative disc disease, cervical     Depression     Elevated LFTs 2024    Family history of breast cancer in first degree relative     heavy maternal side history    Family history of systemic lupus erythematosus     Fibromyalgia     GERD (gastroesophageal reflux disease)     History of necrotizing fasciitis     HLD (hyperlipidemia)     Hydronephrosis     IBS (irritable bowel syndrome)     Insomnia     Migraines     Mild depression     Onychomycosis     Recurrent sinusitis     Renal cyst     Sigmoid diverticulosis     Tension-type headache     Thoracic degenerative disc disease     Traumatic injury of back      Past Surgical History:   Procedure Laterality Date    ABDOMINOPLASTY  2003    Reconstruction from Necrotizing Fascitis    APPENDECTOMY  2010    CERVICAL SPINE SURGERY       SECTION       SECTION       SECTION       SECTION      CHOLECYSTECTOMY      COLONOSCOPY      Dr Chio Santos    CYSTOSCOPY W/ URETERAL STENT PLACEMENT Right 2017    Dr Arpit Duarte    CYSTOSCOPY W/ URETERAL STENT PLACEMENT  2017    Dr Arpit Duarte    DEBRIDEMENT, ABDOMINAL WALL, FOR NECROTIZING SOFT TISSUE INFECTION      REFRACTIVE SURGERY      ROBOT-ASSISTED LAPAROSCOPIC PYELOPLASTY Right 2017    Dr" Davonte Cohen    THORACIC SPINE SURGERY  2006    TONSILLECTOMY  1971    TOTAL ABDOMINAL HYSTERECTOMY W/ BILATERAL SALPINGOOPHORECTOMY  2002     Family History   Problem Relation Name Age of Onset    Alcohol abuse Mother      Parkinsonism Mother      Psychosis Mother      Hypertension Mother      Anxiety disorder Mother      Hyperlipidemia Mother      Leukemia Mother  67        Cause of death    Lymphoma Mother  67        Cause of death    Breast cancer Mother      Heart failure Mother      Alcohol abuse Father      Mitral valve prolapse Father      Anxiety disorder Father      Psychosis Father      Mitral valve prolapse Sister      Alcohol abuse Sister      Breast cancer Maternal Grandmother      Breast cancer Maternal Aunt      Breast cancer Maternal Aunt      Breast cancer Maternal Aunt       Social History[1]  Review of Systems   Constitutional:  Positive for fever. Negative for chills.   Gastrointestinal:  Positive for abdominal pain, diarrhea and nausea. Negative for constipation and vomiting.   Genitourinary:  Positive for difficulty urinating and flank pain.   All other systems reviewed and are negative.      Physical Exam     Initial Vitals [02/28/25 2026]   BP Pulse Resp Temp SpO2   127/81 72 20 97.8 °F (36.6 °C) 97 %      MAP       --         Physical Exam    Nursing note and vitals reviewed.  Constitutional: She appears well-developed and well-nourished. She is not diaphoretic. No distress.   HENT:   Head: Normocephalic and atraumatic.   Cardiovascular:  Normal rate, regular rhythm and normal heart sounds.     Exam reveals no gallop and no friction rub.       No murmur heard.  Pulmonary/Chest: Breath sounds normal. No respiratory distress. She has no wheezes. She has no rhonchi. She has no rales.   Abdominal: Abdomen is soft. Bowel sounds are normal. She exhibits no distension and no mass. There is abdominal tenderness (generalized abdominal tenderness to palpation).   There is right CVA tenderness.  There  is left CVA tenderness. There is no rebound and no guarding.   Musculoskeletal:         General: Normal range of motion.     Neurological: She is alert and oriented to person, place, and time.   Skin: Skin is warm and dry.   Psychiatric: She has a normal mood and affect. Her behavior is normal.         ED Course   Procedures  Labs Reviewed   COMPREHENSIVE METABOLIC PANEL - Abnormal       Result Value    Sodium 141      Potassium 3.2 (*)     Chloride 114 (*)     CO2 19 (*)     Glucose 109 (*)     Blood Urea Nitrogen 13.0      Creatinine 0.99      Calcium 8.9      Protein Total 7.6      Albumin 4.0      Globulin 3.6 (*)     Albumin/Globulin Ratio 1.1      Bilirubin Total 0.2      ALP 96      ALT 21      AST 18      eGFR >60      Anion Gap 8.0      BUN/Creatinine Ratio 13     URINALYSIS - Abnormal    Color, UA Yellow      Appearance, UA Clear      Specific Gravity, UA 1.025      pH, UA 6.0      Protein, UA Negative      Glucose, UA Negative      Ketones, UA Negative      Blood, UA Trace-Intact (*)     Bilirubin, UA Negative      Urobilinogen, UA 0.2      Nitrites, UA Negative      Leukocyte Esterase, UA Negative     CBC WITH DIFFERENTIAL - Abnormal    WBC 7.89      RBC 3.81 (*)     Hgb 11.9 (*)     Hct 36.3 (*)     MCV 95.3 (*)     MCH 31.2 (*)     MCHC 32.8 (*)     RDW 13.1      Platelet 262      MPV 9.2      Neut % 64.2      Lymph % 29.0      Mono % 5.4      Eos % 0.8      Basophil % 0.3      Imm Grans % 0.3      Neut # 5.07      Lymph # 2.29      Mono # 0.43      Eos # 0.06      Baso # 0.02      Imm Gran # 0.02     URINALYSIS, MICROSCOPIC - Abnormal    Bacteria, UA None Seen      Mucous, UA Trace (*)     RBC, UA 6-10 (*)     WBC, UA None Seen      Squamous Epithelial Cells, UA Rare     CBC W/ AUTO DIFFERENTIAL    Narrative:     The following orders were created for panel order CBC auto differential.  Procedure                               Abnormality         Status                     ---------                       "         -----------         ------                     CBC with Differential[[id number]]       Abnormal            Final result                 Please view results for these tests on the individual orders.          Imaging Results              CT Abdomen Pelvis With IV Contrast NO Oral Contrast (Preliminary result)  Result time 02/28/25 23:00:18      Wet Read by Anthony Duarte DO (02/28/25 23:00:18, [facility] - Emergency Dept, Emergency Medicine)    No bladder stones.                                     Medications   ketorolac injection 30 mg (has no administration in time range)   ondansetron injection 4 mg (4 mg Intravenous Given 2/28/25 2135)   iopamidoL (ISOVUE-370) injection 100 mL (100 mLs Intravenous Given 2/28/25 2215)   iopamidoL (ISOVUE-370) injection 100 mL (100 mLs Intravenous Given 2/28/25 2256)     Medical Decision Making  Pt is a 53 y/o female with hx of HLD, anxiety, depression, right hydronephrosis who presents with urinary retnetion since this morning. States that she was able to urinate at 3am, but has not been able to go since that time. Feels the urge but is not able to. Has associated abdominal pain that is suprapubic and radiates to the right. Rates an 8/10, feels like a throbbing/pressure. Has not taken anything for her symptoms. States that she did have fever of 100.6 this afternoon, took tylenol around 5pm. Has associated nausea, denies vomiting. LBM yesterday which was diarrhea, onset two days ago. Notes that she does have hx of urinary retention 10 years ago, states that her right ureter was "twisted" and required reconstruction, was told it could recur. Notes that she also had dilation of the right kidney that required surgery about 5 years ago. Followed with urology but was d/c from clinic about 2 years ago.     Hx of multiple abdominal surgeries including 3 C-sections, hysterectomy, complicated necrotizing fasciitis postoperative, laparoscopic appendectomy, laparoscopic " cholecystectomy. May 2017 was treated for Right acquired ureteral obstruction, hydronephrosis, pyelonephritis. Cystoscopy was negative at that time. June 2017 underwent right robotic assisted dismembered pyeloplasty, which was well tolerated and uncomplicated.    Patient signed out to me, Dr. Duarte, at end of shift.  I have seen and evaluated the patient.  Rodriguez catheter placed.  CT abdomen and pelvis shows no obstructive uropathy.  Given Urology referral.  Patient recently started on Wellbutrin which maybe causative for her urinary retention as there has been some post marketing reports of this medication causing urinary retention.  Instructed to discuss this with her PCP.  Given strict ED return precautions. I have spoken with the patient and/or caregivers. I have explained the patient's condition, diagnoses and treatment plan based on the information available to me at this time. I have answered the patient's and/or caregiver's questions and addressed any concerns. The patient and/or caregivers have as good an understanding of the patient's diagnosis, condition and treatment plan as can be expected at this point. The vital signs have been stable. The patient's condition is stable and appropriate for discharge from the emergency department.     The patient will pursue further outpatient evaluation with the primary care physician or other designated or consulting physician as outlined in the discharge instructions. The patient and/or caregivers are agreeable to this plan of care and follow-up instructions have been explained in detail. The patient and/or caregivers have received these instructions in written format and have expressed an understanding of the discharge instructions. The patient and/or caregivers are aware that any significant change in condition or worsening of symptoms should prompt an immediate return to this or the closest emergency department or a call to 911.    Amount and/or Complexity of  Data Reviewed  External Data Reviewed: notes.  Labs: ordered. Decision-making details documented in ED Course.  Radiology: ordered and independent interpretation performed. Decision-making details documented in ED Course.    Risk  OTC drugs.  Prescription drug management.      Additional MDM:   Differential Diagnosis:   Other: The following diagnoses were also considered and will be evaluated: UTI, obstructing ureterolithiasis and pyelonephritis.            ED Course as of 02/28/25 2302   Fri Feb 28, 2025 2123 WBC: 7.89 [ME]   2123 Hemoglobin(!): 11.9 [ME]   2123 Hematocrit(!): 36.3 [ME]   2123 Sodium: 141 [ME]   2123 Potassium(!): 3.2 [ME]   2123 Chloride(!): 114 [ME]   2123 CO2(!): 19  Unchanged/improved from 6-8 months ago   [ME]   2123 eGFR: >60 [ME]   2123 Creatinine: 0.99 [ME]   2123 BUN: 13.0 [ME]   2123 Anion Gap: 8.0 [ME]   2135 Leukocyte Esterase, UA: Negative [ME]   2135 NITRITE UA: Negative [ME]   2135 Blood, UA(!): Trace-Intact [ME]      ED Course User Index  [ME] Irlanda Yin PA                           Clinical Impression:  Final diagnoses:  [R33.9] Urinary retention (Primary)          ED Disposition Condition    Discharge Stable          ED Prescriptions    None       Follow-up Information       Follow up With Specialties Details Why Contact Info    Louise Monroy, FNP Family Medicine, Emergency Medicine Schedule an appointment as soon as possible for a visit   [address] [place] [address]  [phone]      [facility] - Emergency Dept Emergency Medicine  If symptoms worsen [address]  [facility] [id number] [phone]                 [1]   Social History  Tobacco Use    Smoking status: Every Day     Current packs/day: 0.50     Average packs/day: 0.5 packs/day for 11.2 years (5.6 ttl pk-yrs)     Types: Cigarettes     Start date: 2014    Smokeless tobacco: Never   Substance Use Topics    Alcohol use: Yes     Comment: occassional    Drug use: Never         Anthony Duarte, DO  02/28/25 6023

## 2025-03-02 ENCOUNTER — HOSPITAL ENCOUNTER (EMERGENCY)
Facility: HOSPITAL | Age: 55
Discharge: HOME | End: 2025-03-02
Attending: EMERGENCY MEDICINE
Payer: MEDICAID

## 2025-03-02 VITALS
HEIGHT: 65 IN | RESPIRATION RATE: 18 BRPM | DIASTOLIC BLOOD PRESSURE: 77 MMHG | TEMPERATURE: 97 F | BODY MASS INDEX: 24.16 KG/M2 | WEIGHT: 145 LBS | SYSTOLIC BLOOD PRESSURE: 122 MMHG | OXYGEN SATURATION: 100 % | HEART RATE: 89 BPM

## 2025-03-02 DIAGNOSIS — T83.9XXA PROBLEM WITH FOLEY CATHETER, INITIAL ENCOUNTER: Primary | ICD-10-CM

## 2025-03-02 PROCEDURE — 99283 EMERGENCY DEPT VISIT LOW MDM: CPT

## 2025-03-02 NOTE — ED PROVIDER NOTES
ED PROVIDER NOTE  3/2/2025    CHIEF COMPLAINT:   Chief Complaint   Patient presents with    Urinary Retention     Difficulty urinating since Friday. Sent home with catheter in place and is having very little drainage. Reports R flank pain today. Given norco 10 at 1530.       HISTORY OF PRESENT ILLNESS:   Rosemarie Pretty is a 54 y.o. female who presents with chief complaint Rodriguez catheter problem.  Patient reports that her Rodriguez catheter has not been draining her urine today.  She initially presented to an outside ED with the same complaint but then for some reason her catheter began to spontaneously drain.  She states that it has not drained essentially any urine since 2:00 p.m. today and now she is having a lot of suprapubic abdominal pain with a sensation of needing to urinate.    The history is provided by the patient.         REVIEW OF SYSTEMS: as noted in the HPI.  NURSING NOTES REVIEWED      PAST MEDICAL/SURGICAL HISTORY:   Past Medical History:   Diagnosis Date    Abdominal hernia with obstruction and without gangrene     Anxiety disorder, unspecified     Bipolar depression     C. difficile colitis     Chronic anxiety     Degenerative disc disease, cervical     Depression     Elevated LFTs 02/28/2024    Family history of breast cancer in first degree relative     heavy maternal side history    Family history of systemic lupus erythematosus     Fibromyalgia     GERD (gastroesophageal reflux disease)     History of necrotizing fasciitis     HLD (hyperlipidemia)     Hydronephrosis     IBS (irritable bowel syndrome)     Insomnia     Migraines     Mild depression     Onychomycosis     Recurrent sinusitis     Renal cyst     Sigmoid diverticulosis     Tension-type headache     Thoracic degenerative disc disease     Traumatic injury of back       Past Surgical History:   Procedure Laterality Date    ABDOMINOPLASTY  2003    Reconstruction from Necrotizing Fascitis    APPENDECTOMY  2010    CERVICAL SPINE SURGERY   "2004     SECTION       SECTION       SECTION       SECTION      CHOLECYSTECTOMY      COLONOSCOPY      Dr Chio Santos    CYSTOSCOPY W/ URETERAL STENT PLACEMENT Right 2017    Dr Arpit Duarte    CYSTOSCOPY W/ URETERAL STENT PLACEMENT  2017    Dr Arpit Duarte    DEBRIDEMENT, ABDOMINAL WALL, FOR NECROTIZING SOFT TISSUE INFECTION  2002    REFRACTIVE SURGERY  1990    ROBOT-ASSISTED LAPAROSCOPIC PYELOPLASTY Right 2017    Dr Davonte Cohen    THORACIC SPINE SURGERY  2006    TONSILLECTOMY  1971    TOTAL ABDOMINAL HYSTERECTOMY W/ BILATERAL SALPINGOOPHORECTOMY         FAMILY HISTORY:   Family History   Problem Relation Name Age of Onset    Alcohol abuse Mother      Parkinsonism Mother      Psychosis Mother      Hypertension Mother      Anxiety disorder Mother      Hyperlipidemia Mother      Leukemia Mother  67        Cause of death    Lymphoma Mother  67        Cause of death    Breast cancer Mother      Heart failure Mother      Alcohol abuse Father      Mitral valve prolapse Father      Anxiety disorder Father      Psychosis Father      Mitral valve prolapse Sister      Alcohol abuse Sister      Breast cancer Maternal Grandmother      Breast cancer Maternal Aunt      Breast cancer Maternal Aunt      Breast cancer Maternal Aunt         SOCIAL HISTORY: Social History[1]    ALLERGIES:   Review of patient's allergies indicates:   Allergen Reactions    Latex Other (See Comments)     Other reaction(s): Blisters/Breaks down skin  "It eats through my skin"         PHYSICAL EXAM:  Initial Vitals [25 1728]   BP Pulse Resp Temp SpO2   115/75 86 18 96.8 °F (36 °C) 99 %      MAP       --         Physical Exam    Nursing note and vitals reviewed.  Constitutional: She appears well-developed and well-nourished.   HENT:   Head: Normocephalic and atraumatic. Mouth/Throat: Uvula is midline and mucous membranes are normal.   Eyes: EOM are normal. Pupils are " equal, round, and reactive to light.   Neck: Trachea normal. Neck supple.   Cardiovascular:  Normal rate, regular rhythm and normal pulses.           Pulmonary/Chest: Effort normal and breath sounds normal.   Abdominal: Abdomen is soft. Bowel sounds are normal. There is abdominal tenderness in the suprapubic area. There is no rebound and no guarding.   Musculoskeletal:         General: Normal range of motion.      Cervical back: Neck supple.     Neurological: She is alert and oriented to person, place, and time. GCS eye subscore is 4. GCS verbal subscore is 5. GCS motor subscore is 6.   Skin: Skin is warm and dry.   Psychiatric: She has a normal mood and affect. Her speech is normal. Thought content normal.         RESULTS:  Labs Reviewed - No data to display  Imaging Results    None         PROCEDURES:  Procedures    ECG:       ED COURSE AND MEDICAL DECISION MAKING:  Medications - No data to display  ED Course as of 03/03/25 0129   Sun Mar 02, 2025   5927 Patient reports feeling better after manipulation of catheter and associated spontaneous drainage of urine. [IB]      ED Course User Index  [IB] Anthony Duarte, DO        Medical Decision Making  54-year-old female presents with decreased output from her Rodriguez catheter differential diagnosis includes obstructed, dislodged, disconnected, among others.  Catheter irrigated with return of urine.  Instructed to follow up with Urology.  Given strict ED return precautions. I have spoken with the patient and/or caregivers. I have explained the patient's condition, diagnoses and treatment plan based on the information available to me at this time. I have answered the patient's and/or caregiver's questions and addressed any concerns. The patient and/or caregivers have as good an understanding of the patient's diagnosis, condition and treatment plan as can be expected at this point. The vital signs have been stable. The patient's condition is stable and appropriate for  discharge from the emergency department.     The patient will pursue further outpatient evaluation with the primary care physician or other designated or consulting physician as outlined in the discharge instructions. The patient and/or caregivers are agreeable to this plan of care and follow-up instructions have been explained in detail. The patient and/or caregivers have received these instructions in written format and have expressed an understanding of the discharge instructions. The patient and/or caregivers are aware that any significant change in condition or worsening of symptoms should prompt an immediate return to this or the closest emergency department or a call to 911.    Amount and/or Complexity of Data Reviewed  External Data Reviewed: notes.    Risk  Prescription drug management.  Decision regarding hospitalization.        CLINICAL IMPRESSION:  1. Problem with Rodriguez catheter, initial encounter        DISPOSITION:   ED Disposition Condition    Discharge Stable            ED Prescriptions    None       Follow-up Information       Follow up With Specialties Details Why Contact Info    Louise Monroy, P Family Medicine, Emergency Medicine Schedule an appointment as soon as possible for a visit   [address] [place] [address]  [phone]      [facility] - Emergency Dept Emergency Medicine  If symptoms worsen [address]  [facility] [id number]  [phone]                 [1]   Social History  Tobacco Use    Smoking status: Every Day     Current packs/day: 0.50     Average packs/day: 0.5 packs/day for 11.2 years (5.6 ttl pk-yrs)     Types: Cigarettes     Start date: 2014    Smokeless tobacco: Never   Substance Use Topics    Alcohol use: Yes     Comment: occassional    Drug use: Never        Anthony Duarte DO  03/03/25 0129

## 2025-03-09 ENCOUNTER — HOSPITAL ENCOUNTER (EMERGENCY)
Facility: HOSPITAL | Age: 55
Discharge: HOME | End: 2025-03-09
Attending: EMERGENCY MEDICINE
Payer: MEDICAID

## 2025-03-09 VITALS
WEIGHT: 145 LBS | SYSTOLIC BLOOD PRESSURE: 115 MMHG | TEMPERATURE: 98 F | HEART RATE: 100 BPM | HEIGHT: 65 IN | DIASTOLIC BLOOD PRESSURE: 75 MMHG | RESPIRATION RATE: 20 BRPM | OXYGEN SATURATION: 100 % | BODY MASS INDEX: 24.16 KG/M2

## 2025-03-09 DIAGNOSIS — T83.9XXA PROBLEM WITH FOLEY CATHETER, INITIAL ENCOUNTER: Primary | ICD-10-CM

## 2025-03-09 LAB
BACTERIA #/AREA URNS AUTO: NORMAL /HPF
BILIRUB UR QL STRIP.AUTO: NEGATIVE
CLARITY UR: CLEAR
COLOR UR AUTO: YELLOW
GLUCOSE UR QL STRIP: NEGATIVE
HGB UR QL STRIP: ABNORMAL
KETONES UR QL STRIP: NEGATIVE
LEUKOCYTE ESTERASE UR QL STRIP: NEGATIVE
NITRITE UR QL STRIP: NEGATIVE
PH UR STRIP: 6 [PH]
PROT UR QL STRIP: NEGATIVE
RBC #/AREA URNS AUTO: NORMAL /HPF
SP GR UR STRIP.AUTO: 1.01 (ref 1–1.03)
SQUAMOUS #/AREA URNS AUTO: NORMAL /HPF
UROBILINOGEN UR STRIP-ACNC: 0.2
WBC #/AREA URNS AUTO: NORMAL /HPF

## 2025-03-09 PROCEDURE — 81003 URINALYSIS AUTO W/O SCOPE: CPT | Performed by: EMERGENCY MEDICINE

## 2025-03-09 PROCEDURE — 51702 INSERT TEMP BLADDER CATH: CPT

## 2025-03-09 PROCEDURE — 99284 EMERGENCY DEPT VISIT MOD MDM: CPT | Mod: 25

## 2025-03-09 NOTE — ED PROVIDER NOTES
ED PROVIDER NOTE  3/9/2025    CHIEF COMPLAINT:   Chief Complaint   Patient presents with    Urinary Retention     Urinary retention with urine leaking around catheter       HISTORY OF PRESENT ILLNESS:   Rosemarie Pretty is a 54 y.o. female who presents with chief complaint Catheter problem.  Patient reports today she began having the urge to urinate and has been having urine coming around her Rodriguez catheter.  Denies abdominal pain, fever, nausea, vomiting.    The history is provided by the patient.         REVIEW OF SYSTEMS: as noted in the HPI.  NURSING NOTES REVIEWED      PAST MEDICAL/SURGICAL HISTORY:   Past Medical History:   Diagnosis Date    Abdominal hernia with obstruction and without gangrene     Anxiety disorder, unspecified     Bipolar depression     C. difficile colitis     Chronic anxiety     Degenerative disc disease, cervical     Depression     Elevated LFTs 2024    Family history of breast cancer in first degree relative     heavy maternal side history    Family history of systemic lupus erythematosus     Fibromyalgia     GERD (gastroesophageal reflux disease)     History of necrotizing fasciitis     HLD (hyperlipidemia)     Hydronephrosis     IBS (irritable bowel syndrome)     Insomnia     Migraines     Mild depression     Onychomycosis     Recurrent sinusitis     Renal cyst     Sigmoid diverticulosis     Tension-type headache     Thoracic degenerative disc disease     Traumatic injury of back       Past Surgical History:   Procedure Laterality Date    ABDOMINOPLASTY  2003    Reconstruction from Necrotizing Fascitis    APPENDECTOMY  2010    CERVICAL SPINE SURGERY  2004     SECTION       SECTION       SECTION       SECTION      CHOLECYSTECTOMY      COLONOSCOPY      Dr Chio Santos    CYSTOSCOPY W/ URETERAL STENT PLACEMENT Right 2017    Dr Arpit Duarte    CYSTOSCOPY W/ URETERAL STENT PLACEMENT  2017    Dr Arpit Duarte     "DEBRIDEMENT, ABDOMINAL WALL, FOR NECROTIZING SOFT TISSUE INFECTION  2002    REFRACTIVE SURGERY  1990    ROBOT-ASSISTED LAPAROSCOPIC PYELOPLASTY Right 06/29/2017    Dr Davonte Cohen    THORACIC SPINE SURGERY  2006    TONSILLECTOMY  1971    TOTAL ABDOMINAL HYSTERECTOMY W/ BILATERAL SALPINGOOPHORECTOMY  2002       FAMILY HISTORY:   Family History   Problem Relation Name Age of Onset    Alcohol abuse Mother      Parkinsonism Mother      Psychosis Mother      Hypertension Mother      Anxiety disorder Mother      Hyperlipidemia Mother      Leukemia Mother  67        Cause of death    Lymphoma Mother  67        Cause of death    Breast cancer Mother      Heart failure Mother      Alcohol abuse Father      Mitral valve prolapse Father      Anxiety disorder Father      Psychosis Father      Mitral valve prolapse Sister      Alcohol abuse Sister      Breast cancer Maternal Grandmother      Breast cancer Maternal Aunt      Breast cancer Maternal Aunt      Breast cancer Maternal Aunt         SOCIAL HISTORY: Social History[1]    ALLERGIES:   Review of patient's allergies indicates:   Allergen Reactions    Latex Other (See Comments)     Other reaction(s): Blisters/Breaks down skin  "It eats through my skin"         PHYSICAL EXAM:  Initial Vitals [03/09/25 1332]   BP Pulse Resp Temp SpO2   112/72 85 16 98 °F (36.7 °C) 97 %      MAP       --         Physical Exam    Nursing note and vitals reviewed.  Constitutional: She appears well-developed and well-nourished.   HENT:   Head: Normocephalic and atraumatic. Mouth/Throat: Uvula is midline and mucous membranes are normal.   Eyes: EOM are normal. Pupils are equal, round, and reactive to light.   Neck: Trachea normal. Neck supple.   Cardiovascular:  Normal rate, regular rhythm and normal pulses.           Pulmonary/Chest: Effort normal and breath sounds normal.   Abdominal: Abdomen is soft. Bowel sounds are normal. There is no abdominal tenderness. There is no rebound and no guarding. "   Musculoskeletal:         General: Normal range of motion.      Cervical back: Neck supple.     Neurological: She is alert and oriented to person, place, and time. GCS eye subscore is 4. GCS verbal subscore is 5. GCS motor subscore is 6.   Skin: Skin is warm and dry.   Psychiatric: She has a normal mood and affect. Her speech is normal. Thought content normal.         RESULTS:  Labs Reviewed   URINALYSIS, REFLEX TO URINE CULTURE - Abnormal       Result Value    Color, UA Yellow      Appearance, UA Clear      Specific Gravity, UA 1.010      pH, UA 6.0      Protein, UA Negative      Glucose, UA Negative      Ketones, UA Negative      Blood, UA 2+ (*)     Bilirubin, UA Negative      Urobilinogen, UA 0.2      Nitrites, UA Negative      Leukocyte Esterase, UA Negative     URINALYSIS, MICROSCOPIC - Normal    Bacteria, UA None Seen      RBC, UA 0-5      WBC, UA None Seen      Squamous Epithelial Cells, UA Occasional       Imaging Results    None         PROCEDURES:  Procedures    ECG:       ED COURSE AND MEDICAL DECISION MAKING:  Medications - No data to display  ED Course as of 03/09/25 1419   Sun Mar 09, 2025   1409 NITRITE UA: Negative [IB]   1409 Leukocyte Esterase, UA: Negative [IB]   1409 Bacteria, UA: None Seen [IB]   1409 RBC, UA: 0-5 [IB]   1409 WBC, UA: None Seen [IB]      ED Course User Index  [IB] Anthony Duarte, DO        Medical Decision Making  54-year-old female who presents with catheter problems.  Rodriguez catheter exchanged for new one.  UA unremarkable.  Given referral to urologist in [place] because we are having difficulty with her to see a urologist hear in [place].  Given strict ED return precautions. I have spoken with the patient and/or caregivers. I have explained the patient's condition, diagnoses and treatment plan based on the information available to me at this time. I have answered the patient's and/or caregiver's questions and addressed any concerns. The patient and/or caregivers have as  good an understanding of the patient's diagnosis, condition and treatment plan as can be expected at this point. The vital signs have been stable. The patient's condition is stable and appropriate for discharge from the emergency department.     The patient will pursue further outpatient evaluation with the primary care physician or other designated or consulting physician as outlined in the discharge instructions. The patient and/or caregivers are agreeable to this plan of care and follow-up instructions have been explained in detail. The patient and/or caregivers have received these instructions in written format and have expressed an understanding of the discharge instructions. The patient and/or caregivers are aware that any significant change in condition or worsening of symptoms should prompt an immediate return to this or the closest emergency department or a call to 911.    Amount and/or Complexity of Data Reviewed  Labs: ordered. Decision-making details documented in ED Course.    Risk  Decision regarding hospitalization.        CLINICAL IMPRESSION:  1. Problem with Rodriguez catheter, initial encounter        DISPOSITION:   ED Disposition Condition    Discharge Stable            ED Prescriptions    None       Follow-up Information       Follow up With Specialties Details Why Contact Info    Louise Monroy, [facility], Emergency Medicine Schedule an appointment as soon as possible for a visit   [address] [place] [address]  [phone]      [facility] - Emergency Dept Emergency Medicine  If symptoms worsen [address] [facility] [id number] [phone]                 [1]   Social History  Tobacco Use    Smoking status: Every Day     Current packs/day: 0.50     Average packs/day: 0.5 packs/day for 11.2 years (5.6 ttl pk-yrs)     Types: Cigarettes     Start date: 2014    Smokeless tobacco: Never   Substance Use Topics    Alcohol use: Yes     Comment: occassional     Drug use: Never        Anthony Duarte,   03/09/25 [id number]

## 2025-03-12 ENCOUNTER — OFFICE VISIT (OUTPATIENT)
Dept: UROLOGY | Facility: CLINIC | Age: 55
End: 2025-03-12
Payer: MEDICAID

## 2025-03-12 VITALS
TEMPERATURE: 99 F | BODY MASS INDEX: 23.93 KG/M2 | SYSTOLIC BLOOD PRESSURE: 88 MMHG | HEART RATE: 83 BPM | DIASTOLIC BLOOD PRESSURE: 58 MMHG | WEIGHT: 143.63 LBS | RESPIRATION RATE: 20 BRPM | HEIGHT: 65 IN | OXYGEN SATURATION: 99 %

## 2025-03-12 DIAGNOSIS — T83.9XXA PROBLEM WITH FOLEY CATHETER, INITIAL ENCOUNTER: ICD-10-CM

## 2025-03-12 DIAGNOSIS — R33.9 URINARY RETENTION: Primary | ICD-10-CM

## 2025-03-12 PROCEDURE — 99214 OFFICE O/P EST MOD 30 MIN: CPT | Mod: PBBFAC | Performed by: NURSE PRACTITIONER

## 2025-03-12 RX ORDER — PREGABALIN 50 MG/1
50 CAPSULE ORAL DAILY
COMMUNITY

## 2025-03-12 RX ORDER — TAMSULOSIN HYDROCHLORIDE 0.4 MG/1
CAPSULE ORAL DAILY
COMMUNITY

## 2025-03-12 RX ORDER — BUPROPION HYDROCHLORIDE 150 MG/1
150 TABLET ORAL DAILY
COMMUNITY

## 2025-03-12 NOTE — PROGRESS NOTES
Placed in room. Seen by Shaan Rehman NP. Spoke with patient. Voiding trail done. Placed in 250 ml. Catheter deflated with 10 ml. Out 300 ml urine.  RTC in 2 weeks for bladder scan.

## 2025-03-12 NOTE — LETTER
March 12, 2025      [facility]  [address] [address]  Phone: [phone]       Patient: Rosemarie Pretty   YOB: 1970  Date of Visit: 03/12/2025    To Whom It May Concern:    Katie Pretty  was at [facility] on 03/12/2025. The patient may return to work/school on 03/13/2025. If you have any questions or concerns, or if I can be of further assistance, please do not hesitate to contact me.    Sincerely,    Georgie Upton LPN

## 2025-03-12 NOTE — PROGRESS NOTES
"Chief Complaint:   Chief Complaint   Patient presents with    2 weeks catheter in       HPI:   Patient is a 54-year-old female referred to Urology for urinary retention.  Patient seen in ER on 03/09/2025  who presented with chief complaint Catheter problem.  Patient reports today she began having the urge to urinate and has been having urine coming around her Rodriguez catheter.  Patient referred to Dr. Cramer on 02/28/2025 with urinary retention with a Rodriguez from the ER but never seen Dr. Cramer.  Patient currently on Flomax 0.4 mg p.o. daily.  Patient had a recent CT on 02/28/2025 revealed right pelviectasis and a left renal cysts.  Today patient presents with the idea to have a voiding trial.  250 mL placed in patient's bladder patient voided 300 mL.  Instructed patient to continue tamsulosin 0.4 mg p.o. daily RTC 2 weeks for bladder trial.      Allergies:  Review of patient's allergies indicates:   Allergen Reactions    Latex Other (See Comments)     Other reaction(s): Blisters/Breaks down skin  "It eats through my skin"         Medications:  Current Medications[1]    Review of Systems:  General: No fever, chills, fatigability, or weight loss.  Skin: No rashes, itching, or changes in color or texture of skin.  Chest: Denies LINDSAY, cyanosis, wheezing, cough, and sputum production.  Abdomen: Appetite fine. No weight loss. Denies diarrhea, abdominal pain, hematemesis, or blood in stool.  Musculoskeletal: No joint stiffness or swelling. Denies back pain.  : As above.  All other review of systems negative.    PMH:  Past Medical History:   Diagnosis Date    Abdominal hernia with obstruction and without gangrene     Anxiety disorder, unspecified     Bipolar depression     C. difficile colitis     Chronic anxiety     Degenerative disc disease, cervical     Depression     Elevated LFTs 02/28/2024    Family history of breast cancer in first degree relative     heavy maternal side history    Family history of systemic lupus " erythematosus     Fibromyalgia     GERD (gastroesophageal reflux disease)     History of necrotizing fasciitis     HLD (hyperlipidemia)     Hydronephrosis     IBS (irritable bowel syndrome)     Insomnia     Migraines     Mild depression     Onychomycosis     Recurrent sinusitis     Renal cyst     Sigmoid diverticulosis     Tension-type headache     Thoracic degenerative disc disease     Traumatic injury of back        PSH:  Past Surgical History:   Procedure Laterality Date    ABDOMINOPLASTY      Reconstruction from Necrotizing Fascitis    APPENDECTOMY  2010    CERVICAL SPINE SURGERY  2004     SECTION       SECTION       SECTION       SECTION      CHOLECYSTECTOMY      COLONOSCOPY      Dr Chio Santos    CYSTOSCOPY W/ URETERAL STENT PLACEMENT Right 2017    Dr Arpit Duarte    CYSTOSCOPY W/ URETERAL STENT PLACEMENT  2017    Dr Arpit Duarte    DEBRIDEMENT, ABDOMINAL WALL, FOR NECROTIZING SOFT TISSUE INFECTION      REFRACTIVE SURGERY      ROBOT-ASSISTED LAPAROSCOPIC PYELOPLASTY Right 2017    Dr Davonte Cohen    THORACIC SPINE SURGERY  2006    TONSILLECTOMY  1971    TOTAL ABDOMINAL HYSTERECTOMY W/ BILATERAL SALPINGOOPHORECTOMY         FamHx:  Family History   Problem Relation Name Age of Onset    Alcohol abuse Mother      Parkinsonism Mother      Psychosis Mother      Hypertension Mother      Anxiety disorder Mother      Hyperlipidemia Mother      Leukemia Mother  67        Cause of death    Lymphoma Mother  67        Cause of death    Breast cancer Mother      Heart failure Mother      Alcohol abuse Father      Mitral valve prolapse Father      Anxiety disorder Father      Psychosis Father      Mitral valve prolapse Sister      Alcohol abuse Sister      Breast cancer Maternal Grandmother      Breast cancer Maternal Aunt      Breast cancer Maternal Aunt      Breast cancer Maternal Aunt         SocHx:  Social  History[2]    Physical Exam:  Vitals:    03/12/25 1304   BP: (!) 88/58   Pulse: 83   Resp: 20   Temp: 98.8 °F (37.1 °C)     General: A&Ox3, no apparent distress, no deformities  Neck: No masses, normal thyroid  Lungs: CTA cam, no use of accessory muscles  Heart: RRR, no arrhythmias  Abdomen: Soft, NT, ND, no masses, no hernias, no hepatosplenomegaly  Lymphatic: Neck and groin nodes negative  Skin: The skin is warm and dry. No jaundice.  Ext: No c/c/e.          Imaging:  CT of the abdomen pelvis with oral contrast on 02/28/2025 revealed: There is mild right-sided pelviectasis. There is a tiny cyst exophytic from the midpole the left kidney posteriorly.       Impression:  1. Problem with Rodriguez catheter, initial encounter  - Ambulatory referral/consult to Urology  2. Mild right-sided pelviectasis  3. Left kidney cyst    Plan:  Instructed patient to continue Flomax 0.4 mg p.o. daily.  Instructed patient on timed voiding every 2-3 hrs, double voiding, avoidance of bladder irritants such as alcohol, citrus foods, chocolate, caffeinated drinks, energy drinks, spicy foods, sugar, caffeine products, sodas. Instructed patient to avoid drinking fluids 1-2 hours prior to bedtime & void immediately before bedtime.   RTC 2 weeks with bladder scan.  Instructed patient if develops any abnormal urologic symptoms notify clinic to be re-evaluate treated or during after hours go to emergency room versus urgent here.                           [facility]       [1]   Current Outpatient Medications   Medication Sig Dispense Refill    ARIPiprazole (ABILIFY) 10 MG Tab Take 10 mg by mouth once daily.      buPROPion (WELLBUTRIN XL) 150 MG TB24 tablet Take 150 mg by mouth once daily.      DULoxetine (CYMBALTA) 60 MG capsule Take 60 mg by mouth once daily.      hydrOXYzine pamoate (VISTARIL) 25 MG Cap Take 25 mg by mouth 3 (three) times daily as needed.      lamoTRIgine (LAMICTAL) 200 MG tablet Take 200 mg by mouth once daily.      ondansetron  (ZOFRAN-ODT) 4 MG TbDL Take 4 mg by mouth every 4 (four) hours as needed (Nausea/Vomiting).      pregabalin (LYRICA) 50 MG capsule Take 50 mg by mouth once daily.      rosuvastatin (CRESTOR) 20 MG tablet Take 1 tablet (20 mg total) by mouth every evening. 30 tablet 5    tamsulosin (FLOMAX) 0.4 mg Cap Take by mouth once daily.      topiramate (TOPAMAX) 100 MG tablet Take 100 mg by mouth 2 (two) times daily.      traZODone (DESYREL) 100 MG tablet Take 200 mg by mouth nightly as needed.      albuterol (PROVENTIL HFA) 90 mcg/actuation inhaler Inhale 2 puffs into the lungs every 4 (four) hours as needed for Wheezing. Rescue 18 g 0    sumatriptan (IMITREX) 25 MG Tab Take 1 tablet (25 mg total) by mouth 4 (four) times daily as needed (migraine). 120 tablet 0     No current facility-administered medications for this visit.   [2]   Social History  Socioeconomic History    Marital status:    Tobacco Use    Smoking status: Every Day     Current packs/day: 0.50     Average packs/day: 0.5 packs/day for 11.2 years (5.6 ttl pk-yrs)     Types: Cigarettes     Start date: 2014    Smokeless tobacco: Never   Substance and Sexual Activity    Alcohol use: Yes     Comment: occassional    Drug use: Never    Sexual activity: Yes     Birth control/protection: See Surgical Hx   Social History Narrative    ** Merged History Encounter **          Social Drivers of Health     Financial Resource Strain: High Risk (5/6/2024)    Overall Financial Resource Strain (CARDIA)     Difficulty of Paying Living Expenses: Hard   Food Insecurity: Food Insecurity Present (5/6/2024)    Hunger Vital Sign     Worried About Running Out of Food in the Last Year: Often true     Ran Out of Food in the Last Year: Sometimes true   Transportation Needs: No Transportation Needs (5/6/2024)    PRAPARE - Transportation     Lack of Transportation (Medical): No     Lack of Transportation (Non-Medical): No   Physical Activity: Sufficiently Active (5/8/2024)    Exercise  Vital Sign     Days of Exercise per Week: 5 days     Minutes of Exercise per Session: 30 min   Stress: No Stress Concern Present (5/6/2024)    [employer] - Occupational Stress Questionnaire     Feeling of Stress : Only a little   Housing Stability: Unknown (5/8/2024)    Housing Stability Vital Sign     Unable to Pay for Housing in the Last Year: No     Homeless in the Last Year: No

## 2025-04-24 DIAGNOSIS — J34.2 DEVIATED NASAL SEPTUM: Primary | ICD-10-CM

## 2025-05-07 ENCOUNTER — HOSPITAL ENCOUNTER (OUTPATIENT)
Dept: RADIOLOGY | Facility: HOSPITAL | Age: 55
Discharge: HOME | End: 2025-05-07
Attending: NURSE PRACTITIONER
Payer: MEDICAID

## 2025-05-07 DIAGNOSIS — R05.1 ACUTE COUGH: ICD-10-CM

## 2025-05-07 PROCEDURE — 71046 X-RAY EXAM CHEST 2 VIEWS: CPT | Mod: TC

## 2025-06-13 ENCOUNTER — HOSPITAL ENCOUNTER (EMERGENCY)
Facility: HOSPITAL | Age: 55
Discharge: HOME | End: 2025-06-13
Attending: EMERGENCY MEDICINE
Payer: MEDICAID

## 2025-06-13 VITALS
HEIGHT: 65 IN | DIASTOLIC BLOOD PRESSURE: 71 MMHG | BODY MASS INDEX: 23.19 KG/M2 | RESPIRATION RATE: 18 BRPM | SYSTOLIC BLOOD PRESSURE: 120 MMHG | WEIGHT: 139.19 LBS | HEART RATE: 74 BPM | TEMPERATURE: 99 F | OXYGEN SATURATION: 99 %

## 2025-06-13 DIAGNOSIS — K02.9 DENTAL CARIES: Primary | ICD-10-CM

## 2025-06-13 PROCEDURE — 99284 EMERGENCY DEPT VISIT MOD MDM: CPT

## 2025-06-13 RX ORDER — PENICILLIN V POTASSIUM 500 MG/1
500 TABLET, FILM COATED ORAL 4 TIMES DAILY
Qty: 40 TABLET | Refills: 0 | Status: SHIPPED | OUTPATIENT
Start: 2025-06-13 | End: 2025-06-23

## 2025-06-13 RX ORDER — HYDROCODONE BITARTRATE AND ACETAMINOPHEN 7.5; 325 MG/1; MG/1
1 TABLET ORAL EVERY 6 HOURS PRN
Qty: 16 TABLET | Refills: 0 | Status: SHIPPED | OUTPATIENT
Start: 2025-06-13 | End: 2025-06-17

## 2025-06-13 NOTE — ED PROVIDER NOTES
"Encounter Date: 2025       History     Chief Complaint   Patient presents with    Dental Pain     Pt c/o left lower dental pain for past 2 days. Took tylenol at 630am and called dentist but unable to get in. Denies fever.      See MDM    The history is provided by the patient. No  was used.     Review of patient's allergies indicates:   Allergen Reactions    Latex Other (See Comments)     Other reaction(s): Blisters/Breaks down skin  "It eats through my skin"       Past Medical History:   Diagnosis Date    Abdominal hernia with obstruction and without gangrene     Anxiety disorder, unspecified     Bipolar depression     C. difficile colitis     Chronic anxiety     Degenerative disc disease, cervical     Depression     Elevated LFTs 2024    Family history of breast cancer in first degree relative     heavy maternal side history    Family history of systemic lupus erythematosus     Fibromyalgia     GERD (gastroesophageal reflux disease)     History of necrotizing fasciitis     HLD (hyperlipidemia)     Hydronephrosis     IBS (irritable bowel syndrome)     Insomnia     Migraines     Mild depression     Onychomycosis     Recurrent sinusitis     Renal cyst     Sigmoid diverticulosis     Tension-type headache     Thoracic degenerative disc disease     Traumatic injury of back      Past Surgical History:   Procedure Laterality Date    ABDOMINOPLASTY  2003    Reconstruction from Necrotizing Fascitis    APPENDECTOMY  2010    CERVICAL SPINE SURGERY  2004     SECTION       SECTION       SECTION       SECTION      CHOLECYSTECTOMY      COLONOSCOPY      Dr Chio Santos    CYSTOSCOPY W/ URETERAL STENT PLACEMENT Right 2017    Dr Arpit Duarte    CYSTOSCOPY W/ URETERAL STENT PLACEMENT  2017    Dr Arpit Duarte    DEBRIDEMENT, ABDOMINAL WALL, FOR NECROTIZING SOFT TISSUE INFECTION      REFRACTIVE SURGERY      ROBOT-ASSISTED " LAPAROSCOPIC PYELOPLASTY Right 06/29/2017    Dr Davonte Cohen    THORACIC SPINE SURGERY  2006    TONSILLECTOMY  1971    TOTAL ABDOMINAL HYSTERECTOMY W/ BILATERAL SALPINGOOPHORECTOMY  2002     Family History   Problem Relation Name Age of Onset    Alcohol abuse Mother      Parkinsonism Mother      Psychosis Mother      Hypertension Mother      Anxiety disorder Mother      Hyperlipidemia Mother      Leukemia Mother  67        Cause of death    Lymphoma Mother  67        Cause of death    Breast cancer Mother      Heart failure Mother      Alcohol abuse Father      Mitral valve prolapse Father      Anxiety disorder Father      Psychosis Father      Mitral valve prolapse Sister      Alcohol abuse Sister      Breast cancer Maternal Grandmother      Breast cancer Maternal Aunt      Breast cancer Maternal Aunt      Breast cancer Maternal Aunt       Social History[1]  Review of Systems   Constitutional:  Negative for fever.   HENT:  Positive for dental problem.    Respiratory:  Negative for cough and shortness of breath.    Cardiovascular:  Negative for chest pain.   Gastrointestinal:  Negative for abdominal pain.   Genitourinary:  Negative for difficulty urinating and dysuria.   Musculoskeletal:  Negative for gait problem.   Skin:  Negative for color change.   Neurological:  Negative for dizziness, speech difficulty and headaches.   Psychiatric/Behavioral:  Negative for hallucinations and suicidal ideas.    All other systems reviewed and are negative.      Physical Exam     Initial Vitals   BP Pulse Resp Temp SpO2   -- -- -- -- --      MAP       --         Physical Exam    Nursing note and vitals reviewed.  Constitutional: She appears well-developed and well-nourished.   HENT:   Head: Normocephalic.   Tenderness to left lower premolar    Eyes: EOM are normal.   Neck:   Normal range of motion.  Cardiovascular:  Normal rate, regular rhythm, normal heart sounds and intact distal pulses.           Pulmonary/Chest: Breath sounds  normal. No respiratory distress.   Abdominal: Abdomen is soft. Bowel sounds are normal. There is no abdominal tenderness.   Musculoskeletal:         General: Normal range of motion.      Cervical back: Normal range of motion.     Neurological: She is alert and oriented to person, place, and time. She has normal strength.   Skin: Skin is warm and dry.   Psychiatric: She has a normal mood and affect. Her behavior is normal. Judgment and thought content normal.         ED Course   Procedures  Labs Reviewed - No data to display       Imaging Results    None          Medications - No data to display  Medical Decision Making  Historian:  Patient.  Patient is a White 54 y.o. female that presents with left lower premolar that has been present few days. Associated symptoms nothing. Surrounding information is nothing. Exacerbated by chewing. Relieved by nothing. Patient treatment prior to arrival none. Risk factors include none. Other history pertaining to this complaint nothing.   Assessment:  See physical exam.  DD:  Dental caries  ED Course: History was obtained.  Physical was performed.  Patient appears to have a dental gisela. Medical or surgical consults:  None. Social determinants that affect healthcare:  None.       Risk  Prescription drug management.  Risk Details: Norco and pen VK                                      Clinical Impression:  Final diagnoses:  [K02.9] Dental caries - left lower premolar (Primary)          ED Disposition Condition    Discharge Stable          ED Prescriptions       Medication Sig Dispense Start Date End Date Auth. Provider    HYDROcodone-acetaminophen (NORCO) 7.5-325 mg per tablet Take 1 tablet by mouth every 6 (six) hours as needed for Pain. 16 tablet 6/13/2025 6/17/2025 Yuriy Ayon FNP    penicillin v potassium (VEETID) 500 MG tablet Take 1 tablet (500 mg total) by mouth 4 (four) times daily. for 10 days 40 tablet 6/13/2025 6/23/2025 Yuriy Ayon FNP          Follow-up  Information       Follow up With Specialties Details Why Contact Info    Your Primary Care Provider  Call in 3 days ed follow up                    [1]   Social History  Tobacco Use    Smoking status: Every Day     Current packs/day: 0.50     Average packs/day: 0.5 packs/day for 11.4 years (5.7 ttl pk-yrs)     Types: Cigarettes     Start date: 2014    Smokeless tobacco: Never   Vaping Use    Vaping status: Never Used   Substance Use Topics    Alcohol use: Yes     Comment: occassional    Drug use: Never        Yuriy Ayon, P  06/13/25 1246

## 2025-06-13 NOTE — Clinical Note
"Rosemarie Topetemilad Pretty was seen and treated in our emergency department on 6/13/2025.  She may return to work on 06/14/2025.       If you have any questions or concerns, please don't hesitate to call.       RN    "

## 2025-06-24 DIAGNOSIS — Z12.31 VISIT FOR SCREENING MAMMOGRAM: Primary | ICD-10-CM

## 2025-06-24 DIAGNOSIS — Z78.0 POST-MENOPAUSAL: ICD-10-CM

## 2025-06-25 ENCOUNTER — OFFICE VISIT (OUTPATIENT)
Dept: OTOLARYNGOLOGY | Facility: CLINIC | Age: 55
End: 2025-06-25
Payer: MEDICAID

## 2025-06-25 VITALS
HEART RATE: 85 BPM | HEIGHT: 65 IN | OXYGEN SATURATION: 99 % | RESPIRATION RATE: 20 BRPM | DIASTOLIC BLOOD PRESSURE: 79 MMHG | BODY MASS INDEX: 22.99 KG/M2 | TEMPERATURE: 98 F | WEIGHT: 138 LBS | SYSTOLIC BLOOD PRESSURE: 116 MMHG

## 2025-06-25 DIAGNOSIS — J34.89 NASAL OBSTRUCTION: ICD-10-CM

## 2025-06-25 DIAGNOSIS — R09.81 NASAL CONGESTION: ICD-10-CM

## 2025-06-25 DIAGNOSIS — J34.829 NASAL VALVE COLLAPSE: ICD-10-CM

## 2025-06-25 DIAGNOSIS — J34.3 HYPERTROPHY OF INFERIOR NASAL TURBINATE: ICD-10-CM

## 2025-06-25 DIAGNOSIS — J34.2 DEVIATED NASAL SEPTUM: Primary | ICD-10-CM

## 2025-06-25 PROCEDURE — 99215 OFFICE O/P EST HI 40 MIN: CPT | Mod: PBBFAC

## 2025-06-25 RX ORDER — LEVOCETIRIZINE DIHYDROCHLORIDE 5 MG/1
5 TABLET, FILM COATED ORAL
COMMUNITY
Start: 2025-06-02

## 2025-06-25 RX ORDER — IPRATROPIUM BROMIDE 21 UG/1
2 SPRAY, METERED NASAL 2 TIMES DAILY
COMMUNITY
Start: 2025-04-06

## 2025-06-25 RX ORDER — OMEPRAZOLE 40 MG/1
40 CAPSULE, DELAYED RELEASE ORAL
COMMUNITY

## 2025-06-25 RX ORDER — HYOSCYAMINE SULFATE 0.12 MG/1
TABLET SUBLINGUAL
COMMUNITY
Start: 2025-01-14

## 2025-06-25 RX ORDER — FLUTICASONE PROPIONATE 50 MCG
2 SPRAY, SUSPENSION (ML) NASAL DAILY
Qty: 15.8 ML | Refills: 3 | Status: SHIPPED | OUTPATIENT
Start: 2025-06-25

## 2025-06-25 RX ORDER — BUTALBITAL, ACETAMINOPHEN AND CAFFEINE 50; 325; 40 MG/1; MG/1; MG/1
TABLET ORAL
COMMUNITY

## 2025-06-25 RX ORDER — SUCRALFATE 1 G/1
1 TABLET ORAL 4 TIMES DAILY
COMMUNITY

## 2025-06-25 NOTE — PROGRESS NOTES
"[facility]  Otolaryngology-Head & Neck Surgery    Office Visit    Rosemarie Pretty  [id number]  1970    CC: nasal obstruction    HPI: Rosemarie Pretyt is a 55 y.o. female who presents for evaluation of nasal obstruction.  States that she has difficulty breathing through the nose all the time.  It is worse on the right side and worse at night.  She also endorses constant rhinorrhea throughout the day that is both anterior and posterior.  She does endorse facial pain and pressure and decreased sense of smell.  She states she has had about 6 sinus infections in the past year but was told many of them were viral and resolved on their own after some time.  She has used Flonase in the distant past but can not remember if she got benefit from this.  Also uses Atrovent intermittently which provides temporary benefit.  She did break her nose in the distant past but did not ever received surgery for this.  Has never had any other head or neck surgery.    Review of patient's allergies indicates:   Allergen Reactions    Latex Other (See Comments)     Other reaction(s): Blisters/Breaks down skin  "It eats through my skin"         Past Medical History:   Diagnosis Date    Abdominal hernia with obstruction and without gangrene     Anxiety disorder, unspecified     Bipolar depression     C. difficile colitis     Chronic anxiety     Degenerative disc disease, cervical     Depression     Elevated LFTs 02/28/2024    Family history of breast cancer in first degree relative     heavy maternal side history    Family history of systemic lupus erythematosus     Fibromyalgia     GERD (gastroesophageal reflux disease)     History of necrotizing fasciitis     HLD (hyperlipidemia)     Hydronephrosis     IBS (irritable bowel syndrome)     Insomnia     Migraines     Mild depression     Onychomycosis     Recurrent sinusitis     Renal cyst     Sigmoid diverticulosis     Tension-type headache     Thoracic " degenerative disc disease     Traumatic injury of back        Past Surgical History:   Procedure Laterality Date    ABDOMINOPLASTY  2003    Reconstruction from Necrotizing Fascitis    APPENDECTOMY  2010    CERVICAL SPINE SURGERY  2004     SECTION       SECTION       SECTION       SECTION      CHOLECYSTECTOMY      COLONOSCOPY      Dr Chio Santos    CYSTOSCOPY W/ URETERAL STENT PLACEMENT Right 2017    Dr Arpit Duarte    CYSTOSCOPY W/ URETERAL STENT PLACEMENT  2017    Dr Arpit Duarte    DEBRIDEMENT, ABDOMINAL WALL, FOR NECROTIZING SOFT TISSUE INFECTION      REFRACTIVE SURGERY  1990    ROBOT-ASSISTED LAPAROSCOPIC PYELOPLASTY Right 2017    Dr Davonte Cohen    THORACIC SPINE SURGERY  2006    TONSILLECTOMY  1971    TOTAL ABDOMINAL HYSTERECTOMY W/ BILATERAL SALPINGOOPHORECTOMY         Social History[1]    Family History   Problem Relation Name Age of Onset    Alcohol abuse Mother      Parkinsonism Mother      Psychosis Mother      Hypertension Mother      Anxiety disorder Mother      Hyperlipidemia Mother      Leukemia Mother  67        Cause of death    Lymphoma Mother  67        Cause of death    Breast cancer Mother      Heart failure Mother      Alcohol abuse Father      Mitral valve prolapse Father      Anxiety disorder Father      Psychosis Father      Mitral valve prolapse Sister      Alcohol abuse Sister      Breast cancer Maternal Grandmother      Breast cancer Maternal Aunt      Breast cancer Maternal Aunt      Breast cancer Maternal Aunt         Encounter Medications[2]    PHYSICAL EXAM:  Vitals:    25 0917   BP: 116/79   Pulse: 85   Resp: 20   Temp: 98.3 °F (36.8 °C)       General Appearance: well nourished, well-developed, alert, oriented, in no acute distress  Head/Face: Normocephalic, atraumatic  Eyes: EOMI, normal conjunctiva  Ears: Hears well at normal conversation volume  Otomicroscopy:   AD: external normal, ear  canal normal, TM intact without effusion or retraction  AS: external normal, ear canal normal, TM intact without effusion or retraction  Nose: External nose with palpable stepoff of the right nasal bone, deviation to the left, right septal deviation, weak upper lateral cartilages R>L with positive modified dianne on both sides, bilateral inferior turbinate hypertrophy  Oral Cavity & Oropharynx: Lips normal. Tongue without masses or lesions. Edentulous top. Oropharynx unremarkable. No masses, lesions, or leukoplakia. Floor of mouth and base of tongue are soft.   Neck: Soft, non-tender, no palpable lymph nodes. Thyroid without nodules or goiter.   Neuro: CN II - XII intact  Psychiatric: oriented to time, place and person, no depression, anxiety or agitation      PERTINENT DATA:  Referral from outside provider noting history of nasal obstruction    ASSESSMENT:  Rosemarie Pretty is a 55 y.o. female with nasal obstruction R>L. She does have positive modified dianne on both sides and septal deviation as well as previous nasal bone fracture that was not treated. She has not yet been on appropriate medical therapy.  We did discuss that we would 1st start her on appropriate medical therapy but that other options would include radiofrequency ablation in the office, possible septoplasty with endonasal spreaders and BITR versus OSRP depending on her willingness and candidacy for surgery. She agrees with this and would like to proceed first with medical therapy.    PLAN:  - Flonase nightly  - Continue atrovent daily  - RTC 2-3 mo to reassess symptoms      Danita Matthews MD  [facility]  9:36 AM 06/25/2025          [1]   Social History  Socioeconomic History    Marital status:    Tobacco Use    Smoking status: Former     Current packs/day: 0.50     Average packs/day: 0.5 packs/day for 11.5 years (5.7 ttl pk-yrs)     Types: Cigarettes     Start date: 2014    Smokeless tobacco: Never    Tobacco comments:      Patient stated that she quit smoking about 4 weeks ago   Vaping Use    Vaping status: Never Used   Substance and Sexual Activity    Alcohol use: Yes     Comment: occassional    Drug use: Never    Sexual activity: Yes     Birth control/protection: See Surgical Hx   Social History Narrative    ** Merged History Encounter **          Social Drivers of Health     Financial Resource Strain: High Risk (5/6/2024)    Overall Financial Resource Strain (CARDIA)     Difficulty of Paying Living Expenses: Hard   Food Insecurity: Food Insecurity Present (5/6/2024)    Hunger Vital Sign     Worried About Running Out of Food in the Last Year: Often true     Ran Out of Food in the Last Year: Sometimes true   Transportation Needs: No Transportation Needs (5/6/2024)    PRAPARE - Transportation     Lack of Transportation (Medical): No     Lack of Transportation (Non-Medical): No   Physical Activity: Sufficiently Active (5/8/2024)    Exercise Vital Sign     Days of Exercise per Week: 5 days     Minutes of Exercise per Session: 30 min   Stress: No Stress Concern Present (5/6/2024)    [employer] [place] [employer] - Occupational Stress Questionnaire     Feeling of Stress : Only a little   Housing Stability: Unknown (5/8/2024)    Housing Stability Vital Sign     Unable to Pay for Housing in the Last Year: No     Homeless in the Last Year: No   [2]   Outpatient Encounter Medications as of 6/25/2025   Medication Sig Dispense Refill    ARIPiprazole (ABILIFY) 10 MG Tab Take 10 mg by mouth once daily.      buPROPion (WELLBUTRIN XL) 150 MG TB24 tablet Take 150 mg by mouth once daily.      DULoxetine (CYMBALTA) 60 MG capsule Take 60 mg by mouth once daily.      hydrOXYzine pamoate (VISTARIL) 25 MG Cap Take 25 mg by mouth 3 (three) times daily as needed.      hyoscyamine 0.125 mg Subl Place under the tongue.      ipratropium (ATROVENT) 21 mcg (0.03 %) nasal spray 2 sprays by Each Nostril route 2 (two) times daily.      lamoTRIgine  (LAMICTAL) 200 MG tablet Take 200 mg by mouth once daily.      levocetirizine (XYZAL) 5 MG tablet Take 5 mg by mouth.      omeprazole (PRILOSEC) 40 MG capsule Take 40 mg by mouth.      ondansetron (ZOFRAN-ODT) 4 MG TbDL Take 4 mg by mouth every 4 (four) hours as needed (Nausea/Vomiting).      pregabalin (LYRICA) 50 MG capsule Take 50 mg by mouth once daily.      rosuvastatin (CRESTOR) 20 MG tablet Take 1 tablet (20 mg total) by mouth every evening. 30 tablet 5    sucralfate (CARAFATE) 1 gram tablet Take 1 g by mouth 4 (four) times daily.      tamsulosin (FLOMAX) 0.4 mg Cap Take by mouth once daily.      topiramate (TOPAMAX) 100 MG tablet Take 100 mg by mouth 2 (two) times daily.      traZODone (DESYREL) 100 MG tablet Take 200 mg by mouth nightly as needed.      albuterol (PROVENTIL HFA) 90 mcg/actuation inhaler Inhale 2 puffs into the lungs every 4 (four) hours as needed for Wheezing. Rescue 18 g 0    butalbital-acetaminophen-caffeine -40 mg (FIORICET, ESGIC) -40 mg per tablet as needed.      [] HYDROcodone-acetaminophen (NORCO) 7.5-325 mg per tablet Take 1 tablet by mouth every 6 (six) hours as needed for Pain. 16 tablet 0    [] penicillin v potassium (VEETID) 500 MG tablet Take 1 tablet (500 mg total) by mouth 4 (four) times daily. for 10 days 40 tablet 0    sumatriptan (IMITREX) 25 MG Tab Take 1 tablet (25 mg total) by mouth 4 (four) times daily as needed (migraine). 120 tablet 0     No facility-administered encounter medications on file as of 2025.

## 2025-07-01 ENCOUNTER — HOSPITAL ENCOUNTER (OUTPATIENT)
Dept: RADIOLOGY | Facility: HOSPITAL | Age: 55
Discharge: HOME | End: 2025-07-01
Payer: MEDICAID

## 2025-07-01 DIAGNOSIS — Z78.0 POST-MENOPAUSAL: ICD-10-CM

## 2025-07-01 DIAGNOSIS — Z12.31 VISIT FOR SCREENING MAMMOGRAM: ICD-10-CM

## 2025-07-01 PROCEDURE — 77063 BREAST TOMOSYNTHESIS BI: CPT | Mod: TC

## 2025-07-01 PROCEDURE — 77080 DXA BONE DENSITY AXIAL: CPT | Mod: TC

## 2025-07-02 ENCOUNTER — HOSPITAL ENCOUNTER (OUTPATIENT)
Dept: RADIOLOGY | Facility: HOSPITAL | Age: 55
Discharge: HOME | End: 2025-07-02
Attending: NURSE PRACTITIONER
Payer: MEDICAID

## 2025-07-02 DIAGNOSIS — Z82.49 FAMILY HISTORY OF CORONARY ARTERY DISEASE: ICD-10-CM

## 2025-07-02 DIAGNOSIS — E78.5 HYPERLIPIDEMIA: ICD-10-CM

## 2025-07-02 DIAGNOSIS — R07.9 CHEST PAIN, UNSPECIFIED: ICD-10-CM

## 2025-07-02 PROCEDURE — 75571 CT HRT W/O DYE W/CA TEST: CPT | Mod: TC

## 2025-07-09 DIAGNOSIS — M54.12 CERVICAL RADICULOPATHY: Primary | ICD-10-CM

## 2025-07-09 DIAGNOSIS — G43.101 MIGRAINE WITH AURA AND WITH STATUS MIGRAINOSUS, NOT INTRACTABLE: Primary | ICD-10-CM

## 2025-07-14 ENCOUNTER — HOSPITAL ENCOUNTER (OUTPATIENT)
Dept: RADIOLOGY | Facility: HOSPITAL | Age: 55
Discharge: HOME | End: 2025-07-14
Attending: NURSE PRACTITIONER
Payer: MEDICAID

## 2025-07-14 DIAGNOSIS — M41.9 KYPHOSCOLIOSIS: ICD-10-CM

## 2025-07-14 DIAGNOSIS — M79.641 RIGHT HAND PAIN: ICD-10-CM

## 2025-07-14 DIAGNOSIS — M79.642 LEFT HAND PAIN: ICD-10-CM

## 2025-07-14 PROCEDURE — 73130 X-RAY EXAM OF HAND: CPT | Mod: TC,RT

## 2025-07-14 PROCEDURE — 73130 X-RAY EXAM OF HAND: CPT | Mod: TC,LT

## 2025-07-14 PROCEDURE — 72082 X-RAY EXAM ENTIRE SPI 2/3 VW: CPT | Mod: TC

## 2025-07-16 DIAGNOSIS — N28.9 DISORDER OF KIDNEY AND URETER: Primary | ICD-10-CM

## 2025-07-23 ENCOUNTER — HOSPITAL ENCOUNTER (OUTPATIENT)
Dept: RADIOLOGY | Facility: HOSPITAL | Age: 55
Discharge: HOME | End: 2025-07-23
Attending: NURSE PRACTITIONER
Payer: MEDICAID

## 2025-07-23 DIAGNOSIS — N28.9 DISORDER OF KIDNEY AND URETER: ICD-10-CM

## 2025-07-23 PROCEDURE — 76770 US EXAM ABDO BACK WALL COMP: CPT | Mod: TC

## 2025-07-28 DIAGNOSIS — H91.90 UNSPECIFIED HEARING LOSS, UNSPECIFIED EAR: Primary | ICD-10-CM

## 2025-07-30 DIAGNOSIS — H91.90 LOSS OF HEARING: Primary | ICD-10-CM
